# Patient Record
Sex: FEMALE | Race: WHITE | NOT HISPANIC OR LATINO | URBAN - METROPOLITAN AREA
[De-identification: names, ages, dates, MRNs, and addresses within clinical notes are randomized per-mention and may not be internally consistent; named-entity substitution may affect disease eponyms.]

---

## 2017-01-06 ENCOUNTER — OUTPATIENT (OUTPATIENT)
Dept: OUTPATIENT SERVICES | Facility: HOSPITAL | Age: 67
LOS: 1 days | Discharge: HOME | End: 2017-01-06

## 2017-02-14 ENCOUNTER — OUTPATIENT (OUTPATIENT)
Dept: OUTPATIENT SERVICES | Facility: HOSPITAL | Age: 67
LOS: 1 days | End: 2017-02-14

## 2017-06-21 ENCOUNTER — OUTPATIENT (OUTPATIENT)
Dept: OUTPATIENT SERVICES | Facility: HOSPITAL | Age: 67
LOS: 1 days | End: 2017-06-21

## 2017-10-12 ENCOUNTER — OUTPATIENT (OUTPATIENT)
Dept: OUTPATIENT SERVICES | Facility: HOSPITAL | Age: 67
LOS: 1 days | End: 2017-10-12

## 2018-01-02 ENCOUNTER — APPOINTMENT (OUTPATIENT)
Dept: COLORECTAL SURGERY | Facility: CLINIC | Age: 68
End: 2018-01-02
Payer: MEDICARE

## 2018-01-02 VITALS
WEIGHT: 188 LBS | TEMPERATURE: 98.1 F | DIASTOLIC BLOOD PRESSURE: 82 MMHG | HEIGHT: 66 IN | SYSTOLIC BLOOD PRESSURE: 155 MMHG | BODY MASS INDEX: 30.22 KG/M2 | HEART RATE: 74 BPM

## 2018-01-02 DIAGNOSIS — K59.09 OTHER CONSTIPATION: ICD-10-CM

## 2018-01-02 DIAGNOSIS — Z86.79 PERSONAL HISTORY OF OTHER DISEASES OF THE CIRCULATORY SYSTEM: ICD-10-CM

## 2018-01-02 DIAGNOSIS — Z86.010 PERSONAL HISTORY OF COLONIC POLYPS: ICD-10-CM

## 2018-01-02 PROCEDURE — 99204 OFFICE O/P NEW MOD 45 MIN: CPT

## 2018-01-02 PROCEDURE — 46221 LIGATION OF HEMORRHOID(S): CPT

## 2018-02-06 ENCOUNTER — APPOINTMENT (OUTPATIENT)
Dept: COLORECTAL SURGERY | Facility: CLINIC | Age: 68
End: 2018-02-06
Payer: MEDICARE

## 2018-02-06 VITALS
WEIGHT: 188 LBS | BODY MASS INDEX: 30.22 KG/M2 | HEART RATE: 72 BPM | SYSTOLIC BLOOD PRESSURE: 144 MMHG | HEIGHT: 66 IN | TEMPERATURE: 98.1 F | DIASTOLIC BLOOD PRESSURE: 80 MMHG

## 2018-02-06 DIAGNOSIS — K64.4 RESIDUAL HEMORRHOIDAL SKIN TAGS: ICD-10-CM

## 2018-02-06 DIAGNOSIS — K64.8 RESIDUAL HEMORRHOIDAL SKIN TAGS: ICD-10-CM

## 2018-02-06 PROCEDURE — 99214 OFFICE O/P EST MOD 30 MIN: CPT | Mod: 25

## 2018-02-06 PROCEDURE — 46221 LIGATION OF HEMORRHOID(S): CPT

## 2018-03-14 ENCOUNTER — OUTPATIENT (OUTPATIENT)
Dept: OUTPATIENT SERVICES | Facility: HOSPITAL | Age: 68
LOS: 1 days | End: 2018-03-14

## 2018-07-03 ENCOUNTER — OUTPATIENT (OUTPATIENT)
Dept: OUTPATIENT SERVICES | Facility: HOSPITAL | Age: 68
LOS: 1 days | End: 2018-07-03

## 2018-11-08 ENCOUNTER — OUTPATIENT (OUTPATIENT)
Dept: OUTPATIENT SERVICES | Facility: HOSPITAL | Age: 68
LOS: 1 days | End: 2018-11-08

## 2019-03-13 ENCOUNTER — OUTPATIENT (OUTPATIENT)
Dept: OUTPATIENT SERVICES | Facility: HOSPITAL | Age: 69
LOS: 1 days | End: 2019-03-13

## 2019-04-29 ENCOUNTER — OUTPATIENT (OUTPATIENT)
Dept: OUTPATIENT SERVICES | Facility: HOSPITAL | Age: 69
LOS: 1 days | End: 2019-04-29

## 2019-05-22 ENCOUNTER — EMERGENCY (EMERGENCY)
Facility: HOSPITAL | Age: 69
LOS: 1 days | End: 2019-05-22
Admitting: EMERGENCY MEDICINE
Payer: MEDICARE

## 2019-05-22 PROCEDURE — 99283 EMERGENCY DEPT VISIT LOW MDM: CPT

## 2019-05-22 PROCEDURE — 71046 X-RAY EXAM CHEST 2 VIEWS: CPT | Mod: 26

## 2019-07-19 ENCOUNTER — OUTPATIENT (OUTPATIENT)
Dept: OUTPATIENT SERVICES | Facility: HOSPITAL | Age: 69
LOS: 1 days | End: 2019-07-19

## 2019-07-26 ENCOUNTER — OUTPATIENT (OUTPATIENT)
Dept: OUTPATIENT SERVICES | Facility: HOSPITAL | Age: 69
LOS: 1 days | End: 2019-07-26

## 2019-08-07 ENCOUNTER — APPOINTMENT (OUTPATIENT)
Dept: CT IMAGING | Facility: CLINIC | Age: 69
End: 2019-08-07
Payer: MEDICARE

## 2019-08-09 PROCEDURE — 70450 CT HEAD/BRAIN W/O DYE: CPT

## 2019-11-25 ENCOUNTER — OUTPATIENT (OUTPATIENT)
Dept: OUTPATIENT SERVICES | Facility: HOSPITAL | Age: 69
LOS: 1 days | End: 2019-11-25

## 2020-01-17 ENCOUNTER — OUTPATIENT (OUTPATIENT)
Dept: OUTPATIENT SERVICES | Facility: HOSPITAL | Age: 70
LOS: 1 days | End: 2020-01-17

## 2020-01-30 ENCOUNTER — APPOINTMENT (OUTPATIENT)
Dept: MRI IMAGING | Facility: CLINIC | Age: 70
End: 2020-01-30
Payer: MEDICARE

## 2020-01-30 PROCEDURE — 70551 MRI BRAIN STEM W/O DYE: CPT

## 2020-03-24 ENCOUNTER — OUTPATIENT (OUTPATIENT)
Dept: OUTPATIENT SERVICES | Facility: HOSPITAL | Age: 70
LOS: 1 days | End: 2020-03-24

## 2020-06-01 ENCOUNTER — OUTPATIENT (OUTPATIENT)
Dept: OUTPATIENT SERVICES | Facility: HOSPITAL | Age: 70
LOS: 1 days | End: 2020-06-01

## 2020-06-11 ENCOUNTER — APPOINTMENT (OUTPATIENT)
Dept: MAMMOGRAPHY | Facility: CLINIC | Age: 70
End: 2020-06-11
Payer: MEDICARE

## 2020-06-11 PROCEDURE — 77063 BREAST TOMOSYNTHESIS BI: CPT

## 2020-06-11 PROCEDURE — 77067 SCR MAMMO BI INCL CAD: CPT

## 2020-08-08 ENCOUNTER — APPOINTMENT (OUTPATIENT)
Dept: MRI IMAGING | Facility: CLINIC | Age: 70
End: 2020-08-08

## 2020-11-16 ENCOUNTER — APPOINTMENT (OUTPATIENT)
Dept: RADIOLOGY | Facility: CLINIC | Age: 70
End: 2020-11-16
Payer: MEDICARE

## 2020-11-16 PROCEDURE — 77080 DXA BONE DENSITY AXIAL: CPT

## 2021-08-14 ENCOUNTER — APPOINTMENT (OUTPATIENT)
Dept: MAMMOGRAPHY | Facility: CLINIC | Age: 71
End: 2021-08-14
Payer: MEDICARE

## 2021-08-14 PROCEDURE — 77063 BREAST TOMOSYNTHESIS BI: CPT

## 2021-08-14 PROCEDURE — 77067 SCR MAMMO BI INCL CAD: CPT

## 2021-08-31 ENCOUNTER — APPOINTMENT (OUTPATIENT)
Dept: MAMMOGRAPHY | Facility: CLINIC | Age: 71
End: 2021-08-31
Payer: MEDICARE

## 2021-08-31 ENCOUNTER — APPOINTMENT (OUTPATIENT)
Dept: ULTRASOUND IMAGING | Facility: CLINIC | Age: 71
End: 2021-08-31

## 2021-08-31 PROCEDURE — 76642 ULTRASOUND BREAST LIMITED: CPT | Mod: LT

## 2021-08-31 PROCEDURE — G0279: CPT | Mod: LT

## 2021-08-31 PROCEDURE — 77065 DX MAMMO INCL CAD UNI: CPT | Mod: LT

## 2022-01-11 ENCOUNTER — APPOINTMENT (OUTPATIENT)
Dept: ULTRASOUND IMAGING | Facility: CLINIC | Age: 72
End: 2022-01-11
Payer: MEDICARE

## 2022-01-11 PROCEDURE — 76641 ULTRASOUND BREAST COMPLETE: CPT | Mod: LT

## 2022-03-31 ENCOUNTER — APPOINTMENT (OUTPATIENT)
Age: 72
End: 2022-03-31
Payer: MEDICARE

## 2022-03-31 VITALS
HEIGHT: 66.14 IN | WEIGHT: 210 LBS | SYSTOLIC BLOOD PRESSURE: 125 MMHG | HEART RATE: 58 BPM | TEMPERATURE: 96.9 F | BODY MASS INDEX: 33.75 KG/M2 | DIASTOLIC BLOOD PRESSURE: 79 MMHG

## 2022-03-31 DIAGNOSIS — K80.20 CALCULUS OF GALLBLADDER W/OUT CHOLECYSTITIS W/OUT OBSTRUCTION: ICD-10-CM

## 2022-03-31 DIAGNOSIS — E07.9 DISORDER OF THYROID, UNSPECIFIED: ICD-10-CM

## 2022-03-31 DIAGNOSIS — E78.00 PURE HYPERCHOLESTEROLEMIA, UNSPECIFIED: ICD-10-CM

## 2022-03-31 DIAGNOSIS — K21.9 GASTRO-ESOPHAGEAL REFLUX DISEASE W/OUT ESOPHAGITIS: ICD-10-CM

## 2022-03-31 DIAGNOSIS — I10 ESSENTIAL (PRIMARY) HYPERTENSION: ICD-10-CM

## 2022-03-31 PROCEDURE — 99203 OFFICE O/P NEW LOW 30 MIN: CPT

## 2022-03-31 RX ORDER — CALCIUM 250 MG
TABLET ORAL
Refills: 0 | Status: ACTIVE | COMMUNITY

## 2022-03-31 RX ORDER — SIMVASTATIN 40 MG/1
40 TABLET, FILM COATED ORAL
Refills: 0 | Status: ACTIVE | COMMUNITY

## 2022-03-31 RX ORDER — LISINOPRIL 10 MG/1
10 TABLET ORAL
Refills: 0 | Status: ACTIVE | COMMUNITY

## 2022-03-31 RX ORDER — DIAZEPAM 5 MG/1
5 TABLET ORAL
Refills: 0 | Status: ACTIVE | COMMUNITY

## 2022-03-31 RX ORDER — ISOSORBIDE MONONITRATE 30 MG
30 TABLET, EXTENDED RELEASE 24 HR ORAL
Refills: 0 | Status: ACTIVE | COMMUNITY

## 2022-03-31 RX ORDER — LAMOTRIGINE 300 MG/1
300 TABLET, EXTENDED RELEASE ORAL
Refills: 0 | Status: ACTIVE | COMMUNITY

## 2022-03-31 RX ORDER — ASPIRIN 325 MG/1
325 TABLET ORAL
Refills: 0 | Status: ACTIVE | COMMUNITY

## 2022-03-31 RX ORDER — LEVOTHYROXINE SODIUM 0.17 MG/1
TABLET ORAL
Refills: 0 | Status: ACTIVE | COMMUNITY

## 2022-03-31 NOTE — ASSESSMENT
[FreeTextEntry1] : Pt is a 71 year old female referred for consultation by Dr. Amy Frederick for ? left cellulitis and mastalgia. Was given Keflex yesterday by him but pt has not yet started. Pt denies fever or chills.\par Here with her sister, Urszula. Pt speaks Polish, sister is translating and she declines . \par Pt states she has had pain in her breasts since her mmg and more bothersome since October. Right nipple pain and Left also but recently some erythema. No masses or d.c.\par \par 8/14/21, NFR, Bilat sMMG: Fg, no susp findings, nodule in UO L breast appears somewhat more prominent than prev, rec add imaging, BR0\par 8/31/21, NFR, L dMMG: FG, bilat circumscribed breast nodularity is not sig changed including a 6x4mm circumscribed nodule in RA 3:00 mid L breast noted on recent screening; L U/S: no susp solid mass, multiple subcm sized cysts noted in UOQ up to 8mm 2:00 2N, mmg 1 year, BR2\par 1/11/22, NFR, L U/S: no susp solid mass, stable 8x6mm cyst 2:00 2N, 8x6mm cyst 3:00 1N, stable subcm cysts in UOQ 3mm, 4mm and 7mm, 6x5mm cyst 9:00 2N, stable 6x4mm cyst 9:00 2N, 2.5x0.7cm benign-appearing axillary lymph node, benign-appearing 10x6mm axillary lymph node, rec mmg and u/s 6 mos, BR3\par \par Fhx: Mother with uterine cancer and brother with lung cancer.\par CBE: Right, some NAC tenderness but no induration, no erythema, and no lesions. Left NAC more tender with slight erythema medial with no evidence of abscess. No underlying mass. No axillary adenopathy.\par Reviewed findings with pt, multiple cysts on left, stable. Rec start the Keflex and symptomatic treatment with NSAIDs, tylenol. F.u next week, sooner as needed. \par Pt and sister reassured finding more consistent with left early cellulitis than malignancy but rec close f.u. and return next week.

## 2022-03-31 NOTE — PHYSICAL EXAM
[Bra Size: ___] : Bra Size: [unfilled] [Normocephalic] : normocephalic [Atraumatic] : atraumatic [Supple] : supple [No Supraclavicular Adenopathy] : no supraclavicular adenopathy [Examined in the supine and seated position] : examined in the supine and seated position [No dominant masses] : no dominant masses in right breast  [No dominant masses] : no dominant masses left breast [No Nipple Retraction] : no left nipple retraction [No Nipple Discharge] : no left nipple discharge [No Axillary Lymphadenopathy] : no left axillary lymphadenopathy [No Edema] : no edema [No Swelling] : no swelling [Full ROM] : full range of motion [No Rashes] : no rashes [No Ulceration] : no ulceration [de-identified] : Slight erythema medial to nipple, tender, no fluctuance or masses.

## 2022-03-31 NOTE — DATA REVIEWED
[FreeTextEntry1] : 8/14/21, NFR, Bilat sMMG: Fg, no susp findings, nodule in UO L breast appears somewhat more prominent than prev, rec add imaging, BR0\par 8/31/21, NFR, L dMMG: FG, bilat circumscribed breast nodularity is not sig changed including a 6x4mm circumscribed nodule in RA 3:00 mid L breast noted on recent screening; L U/S: no susp solid mass, multiple subcm sized cysts noted in UOQ up to 8mm 2:00 2N, mmg 1 year, BR2\par 1/11/22, NFR, L U/S: no susp solid mass, stable 8x6mm cyst 2:00 2N, 8x6mm cyst 3:00 1N, stable subcm cysts in UOQ 3mm, 4mm and 7mm, 6x5mm cyst 9:00 2N, stable 6x4mm cyst 9:00 2N, 2.5x0.7cm benign-appearing axillary lymph node, benign-appearing 10x6mm axillary lymph node, rec mmg and u/s 6 mos, BR3\par

## 2022-03-31 NOTE — HISTORY OF PRESENT ILLNESS
[FreeTextEntry1] : Pt is a 71 year old female referred for consultation by Dr. Amy Frederick for ? left cellulitis and mastalgia. Was given Keflex yesterday by him but pt has not yet started. Pt denies fever or chills.\par Here with her sister, Urszula. Pt speaks Polish, sister is translating and she declines . \par Pt states she has had pain in her breasts since her mmg and more bothersome since October. Right nipple pain and Left also but recently some erythema. No masses or d.c.\par \par 8/14/21, NFR, Bilat sMMG: Fg, no susp findings, nodule in UO L breast appears somewhat more prominent than prev, rec add imaging, BR0\par 8/31/21, NFR, L dMMG: FG, bilat circumscribed breast nodularity is not sig changed including a 6x4mm circumscribed nodule in RA 3:00 mid L breast noted on recent screening; L U/S: no susp solid mass, multiple subcm sized cysts noted in UOQ up to 8mm 2:00 2N, mmg 1 year, BR2\par 1/11/22, NFR, L U/S: no susp solid mass, stable 8x6mm cyst 2:00 2N, 8x6mm cyst 3:00 1N, stable subcm cysts in UOQ 3mm, 4mm and 7mm, 6x5mm cyst 9:00 2N, stable 6x4mm cyst 9:00 2N, 2.5x0.7cm benign-appearing axillary lymph node, benign-appearing 10x6mm axillary lymph node, rec mmg and u/s 6 mos, BR3\par \par Fhx: Mother with uterine cancer and brother with lung cancer.\par

## 2022-03-31 NOTE — PAST MEDICAL HISTORY
[Menarche Age ____] : age at menarche was [unfilled] [Menopause Age____] : age at menopause was [unfilled] [unknown] : the patient is unsure of the date of her LMP [Total Preg ___] : G[unfilled]

## 2022-03-31 NOTE — CONSULT LETTER
[Dear  ___] : Dear  [unfilled], [Consult Letter:] : I had the pleasure of evaluating your patient, [unfilled]. [Consult Closing:] : Thank you very much for allowing me to participate in the care of this patient.  If you have any questions, please do not hesitate to contact me. [Sincerely,] : Sincerely, [FreeTextEntry3] : Nati Campos MD

## 2022-04-07 ENCOUNTER — APPOINTMENT (OUTPATIENT)
Dept: BREAST CENTER | Facility: CLINIC | Age: 72
End: 2022-04-07
Payer: MEDICARE

## 2022-04-07 VITALS
WEIGHT: 214 LBS | SYSTOLIC BLOOD PRESSURE: 137 MMHG | HEIGHT: 66 IN | TEMPERATURE: 95.5 F | DIASTOLIC BLOOD PRESSURE: 83 MMHG | BODY MASS INDEX: 34.39 KG/M2 | HEART RATE: 66 BPM

## 2022-04-07 DIAGNOSIS — Z80.1 FAMILY HISTORY OF MALIGNANT NEOPLASM OF TRACHEA, BRONCHUS AND LUNG: ICD-10-CM

## 2022-04-07 DIAGNOSIS — Z80.49 FAMILY HISTORY OF MALIGNANT NEOPLASM OF OTHER GENITAL ORGANS: ICD-10-CM

## 2022-04-07 PROCEDURE — 99213 OFFICE O/P EST LOW 20 MIN: CPT

## 2022-04-07 NOTE — ASSESSMENT
[FreeTextEntry1] : Pt is a 71 year old female here for f/u for ? left cellulitis and mastalgia. Has been on Keflex x7 days, has 3  more days. No fever or chills.  Feels the erythema has improved as well as the tenderness. \par Here with her sister, Urszula. Pt speaks Polish, sister is translating and she declines . \par Pt states she has had pain in her breasts since her mmg and more bothersome since October. Right nipple pain and Left also but recently some erythema. No masses or d.c. Started 10 day course of Keflex 4/1/22 (on day 6)\par Referred by Dr. Amy Frederick\par \par 8/14/21, NFR, Bilat sMMG: Fg, no susp findings, nodule in UO L breast appears somewhat more prominent than prev, rec add imaging, BR0\par 8/31/21, NFR, L dMMG: FG, bilat circumscribed breast nodularity is not sig changed including a 6x4mm circumscribed nodule in RA 3:00 mid L breast noted on recent screening; L U/S: no susp solid mass, multiple subcm sized cysts noted in UOQ up to 8mm 2:00 2N, mmg 1 year, BR2\par 1/11/22, NFR, L U/S: no susp solid mass, stable 8x6mm cyst 2:00 2N, 8x6mm cyst 3:00 1N, stable subcm cysts in UOQ 3mm, 4mm and 7mm, 6x5mm cyst 9:00 2N, stable 6x4mm cyst 9:00 2N, 2.5x0.7cm benign-appearing axillary lymph node, benign-appearing 10x6mm axillary lymph node, rec mmg and u/s 6 mos, BR3\par \par Fhx: Mother with uterine cancer and brother with lung cancer.\par CBE: Right, some NAC tenderness but no induration, no erythema, and no lesions. Left NAC less tender with improvement of slight erythema medially with no evidence of abscess. No underlying mass. No axillary adenopathy.\par Pt to f/u w/ Dr. Frederick On Monday 4/11 - Continue abx and rec for 4 more days of Keflex for total of 14 days. Pt to f/u next Wednesday or sooner as needed.

## 2022-04-07 NOTE — CONSULT LETTER
[Dear  ___] : Dear  [unfilled], [Courtesy Letter:] : I had the pleasure of seeing your patient, [unfilled], in my office today. [Please see my note below.] : Please see my note below. [Consult Closing:] : Thank you very much for allowing me to participate in the care of this patient.  If you have any questions, please do not hesitate to contact me. [Sincerely,] : Sincerely, [FreeTextEntry3] : Nati Campos MD

## 2022-04-07 NOTE — HISTORY OF PRESENT ILLNESS
[FreeTextEntry1] : Pt is a 71 year old female here for f/u for ? left cellulitis and mastalgia. Has been on Keflex x7 days, has 3  more days. No fever or chills.  Feels the erythema has improved as well as the tenderness. \par Here with her sister, Urszula. Pt speaks Polish, sister is translating and she declines . \par Pt states she has had pain in her breasts since her mmg and more bothersome since October. Right nipple pain and Left also but recently some erythema. No masses or d.c. Started 10 day course of Keflex 4/1/22 (on day 6)\par Referred by Dr. Amy Frederick\par \par 8/14/21, NFR, Bilat sMMG: Fg, no susp findings, nodule in UO L breast appears somewhat more prominent than prev, rec add imaging, BR0\par 8/31/21, NFR, L dMMG: FG, bilat circumscribed breast nodularity is not sig changed including a 6x4mm circumscribed nodule in RA 3:00 mid L breast noted on recent screening; L U/S: no susp solid mass, multiple subcm sized cysts noted in UOQ up to 8mm 2:00 2N, mmg 1 year, BR2\par 1/11/22, NFR, L U/S: no susp solid mass, stable 8x6mm cyst 2:00 2N, 8x6mm cyst 3:00 1N, stable subcm cysts in UOQ 3mm, 4mm and 7mm, 6x5mm cyst 9:00 2N, stable 6x4mm cyst 9:00 2N, 2.5x0.7cm benign-appearing axillary lymph node, benign-appearing 10x6mm axillary lymph node, rec mmg and u/s 6 mos, BR3\par \par Fhx: Mother with uterine cancer and brother with lung cancer.\par

## 2022-04-07 NOTE — PHYSICAL EXAM
[Normocephalic] : normocephalic [Atraumatic] : atraumatic [Supple] : supple [No Supraclavicular Adenopathy] : no supraclavicular adenopathy [No Thyromegaly] : no thyromegaly [Examined in the supine and seated position] : examined in the supine and seated position [No dominant masses] : no dominant masses in right breast  [No dominant masses] : no dominant masses left breast [No Nipple Retraction] : no left nipple retraction [No Nipple Discharge] : no left nipple discharge [No Axillary Lymphadenopathy] : no left axillary lymphadenopathy [No Edema] : no edema [No Swelling] : no swelling [Full ROM] : full range of motion [No Rashes] : no rashes [No Ulceration] : no ulceration [de-identified] : Right, some NAC tenderness but no induration, no erythema, and no lesions. Left NAC less tender with improvement of slight erythema medially with no evidence of abscess. No underlying mass.

## 2022-04-13 ENCOUNTER — APPOINTMENT (OUTPATIENT)
Age: 72
End: 2022-04-13
Payer: MEDICARE

## 2022-04-13 VITALS
WEIGHT: 214 LBS | TEMPERATURE: 97.3 F | DIASTOLIC BLOOD PRESSURE: 83 MMHG | HEIGHT: 66 IN | HEART RATE: 53 BPM | BODY MASS INDEX: 34.39 KG/M2 | SYSTOLIC BLOOD PRESSURE: 149 MMHG

## 2022-04-13 PROCEDURE — 99214 OFFICE O/P EST MOD 30 MIN: CPT

## 2022-04-13 NOTE — HISTORY OF PRESENT ILLNESS
[FreeTextEntry1] : Pt is a 71 year old female here for f/u for ? left cellulitis and mastalgia. Has been on Keflex x13 days, has 1 more day. No fever or chills. Feels the erythema has improved as well as the tenderness but still has some residual. \par Here with her sister, Urszula. Pt speaks Polish, sister is translating and she declines . \par Pt states she has had pain in her breasts since her mmg and more bothersome since October. Right nipple pain and Left also but recently some erythema. No masses or d.c. Started 10 day course of Keflex 4/1/22.\par Referred by Dr. Amy Frederick\par Saw Dr. Frederick and he rec continued f.u with me.\par 8/14/21, NFR, Bilat sMMG: Fg, no susp findings, nodule in UO L breast appears somewhat more prominent than prev, rec add imaging, BR0\par 8/31/21, NFR, L dMMG: FG, bilat circumscribed breast nodularity is not sig changed including a 6x4mm circumscribed nodule in RA 3:00 mid L breast noted on recent screening; L U/S: no susp solid mass, multiple subcm sized cysts noted in UOQ up to 8mm 2:00 2N, mmg 1 year, BR2\par 1/11/22, NFR, L U/S: no susp solid mass, stable 8x6mm cyst 2:00 2N, 8x6mm cyst 3:00 1N, stable subcm cysts in UOQ 3mm, 4mm and 7mm, 6x5mm cyst 9:00 2N, stable 6x4mm cyst 9:00 2N, 2.5x0.7cm benign-appearing axillary lymph node, benign-appearing 10x6mm axillary lymph node, rec mmg and u/s 6 mos, BR3\par \par Fhx: Mother with uterine cancer and brother with lung cancer.\par

## 2022-04-13 NOTE — CONSULT LETTER
[Dear  ___] : Dear  [unfilled], [Consult Letter:] : I had the pleasure of evaluating your patient, [unfilled]. [Please see my note below.] : Please see my note below. [Consult Closing:] : Thank you very much for allowing me to participate in the care of this patient.  If you have any questions, please do not hesitate to contact me. [Sincerely,] : Sincerely, [FreeTextEntry3] : Nati Campos MD

## 2022-04-13 NOTE — ASSESSMENT
[FreeTextEntry1] : Pt is a 71 year old female here for f/u for ? left cellulitis and mastalgia. Has been on Keflex x13 days, has 1 more day. No fever or chills. Feels the erythema has improved as well as the tenderness but still has some residual. \par Here with her sister, Urszula. Pt speaks Polish, sister is translating and she declines . \par Pt states she has had pain in her breasts since her mmg and more bothersome since October. Right nipple pain and Left also but recently some erythema. No masses or d.c. Started 10 day course of Keflex 4/1/22.\par Referred by Dr. Amy Frederick\par Saw Dr. Frederick and he rec continued f.u with me.\par 8/14/21, NFR, Bilat sMMG: Fg, no susp findings, nodule in UO L breast appears somewhat more prominent than prev, rec add imaging, BR0\par 8/31/21, NFR, L dMMG: FG, bilat circumscribed breast nodularity is not sig changed including a 6x4mm circumscribed nodule in RA 3:00 mid L breast noted on recent screening; L U/S: no susp solid mass, multiple subcm sized cysts noted in UOQ up to 8mm 2:00 2N, mmg 1 year, BR2\par 1/11/22, NFR, L U/S: no susp solid mass, stable 8x6mm cyst 2:00 2N, 8x6mm cyst 3:00 1N, stable subcm cysts in UOQ 3mm, 4mm and 7mm, 6x5mm cyst 9:00 2N, stable 6x4mm cyst 9:00 2N, 2.5x0.7cm benign-appearing axillary lymph node, benign-appearing 10x6mm axillary lymph node, rec mmg and u/s 6 mos, BR3\par \par Fhx: Mother with uterine cancer and brother with lung cancer.\par CBE: Right, some NAC tenderness, no erythema, and no lesions. Left NAC with continued slight induration. No underlying mass. On further exam the tenderness on left appears to be more chest wall MSK tenderness than breast tenderness.  No axillary adenopathy.\par Pt is not requiring pain meds as the left tenderness is only with palpation. Pt's sister states the issues started after her mmg.  States patient didn't have any pain prior to it. Discussed could be costochondritis, or trauma from the mammogram but rec symptomatic treatment.  Discussed prob MSK, tylenol for any discomfort rec. Pt is on 325 of aspirin so does not want to take NSAIDs.\par Given the diffuse slight NAC induration, rec breast MRI. \par Finish the antibiotics. \par \par

## 2022-04-21 ENCOUNTER — APPOINTMENT (OUTPATIENT)
Dept: MRI IMAGING | Facility: CLINIC | Age: 72
End: 2022-04-21
Payer: MEDICARE

## 2022-04-21 PROCEDURE — 77049 MRI BREAST C-+ W/CAD BI: CPT | Mod: ME

## 2022-04-21 PROCEDURE — A9585: CPT | Mod: JW

## 2022-04-22 ENCOUNTER — NON-APPOINTMENT (OUTPATIENT)
Age: 72
End: 2022-04-22

## 2022-04-27 ENCOUNTER — APPOINTMENT (OUTPATIENT)
Dept: BREAST CENTER | Facility: CLINIC | Age: 72
End: 2022-04-27
Payer: MEDICARE

## 2022-04-27 VITALS
TEMPERATURE: 97.1 F | WEIGHT: 213.18 LBS | SYSTOLIC BLOOD PRESSURE: 155 MMHG | HEIGHT: 66 IN | DIASTOLIC BLOOD PRESSURE: 84 MMHG | BODY MASS INDEX: 34.26 KG/M2 | HEART RATE: 62 BPM

## 2022-04-27 DIAGNOSIS — N64.51 INDURATION OF BREAST: ICD-10-CM

## 2022-04-27 DIAGNOSIS — N64.4 MASTODYNIA: ICD-10-CM

## 2022-04-27 DIAGNOSIS — N61.0 MASTITIS WITHOUT ABSCESS: ICD-10-CM

## 2022-04-27 DIAGNOSIS — R93.89 ABNORMAL FINDINGS ON DIAGNOSTIC IMAGING OF OTHER SPECIFIED BODY STRUCTURES: ICD-10-CM

## 2022-04-27 PROCEDURE — 99213 OFFICE O/P EST LOW 20 MIN: CPT

## 2022-04-27 NOTE — HISTORY OF PRESENT ILLNESS
[FreeTextEntry1] : Pt is a 71 year old female here for f/u after recent MRI for ? left cellulitis and mastalgia. Had been on Keflex x13 days. No fever or chills. Erythema has improved as well as the tenderness but still has some residual. \par Here with her sister, Urszula. Pt speaks Polish, sister is translating and she declines . \par Pt with pain in her breasts since her mmg and more bothersome since October. Right nipple pain and Left also but recently some erythema. No masses or d.c. \par Referred by Dr. Amy Frederick\par Saw Dr. Frederick and he rec continued f.u with me.\par 8/14/21, NFR, Bilat sMMG: Fg, no susp findings, nodule in UO L breast appears somewhat more prominent than prev, rec add imaging, BR0\par 8/31/21, NFR, L dMMG: FG, bilat circumscribed breast nodularity is not sig changed including a 6x4mm circumscribed nodule in RA 3:00 mid L breast noted on recent screening; L U/S: no susp solid mass, multiple subcm sized cysts noted in UOQ up to 8mm 2:00 2N, mmg 1 year, BR2\par 1/11/22, NFR, L U/S: no susp solid mass, stable 8x6mm cyst 2:00 2N, 8x6mm cyst 3:00 1N, stable subcm cysts in UOQ 3mm, 4mm and 7mm, 6x5mm cyst 9:00 2N, stable 6x4mm cyst 9:00 2N, 2.5x0.7cm benign-appearing axillary lymph node, benign-appearing 10x6mm axillary lymph node, rec mmg and u/s 6 mos, BR3\par \par 4/21/22, Sharla, MRI: R negative, L overall generalized increased enhancement, somewhat asymm ehancement in central L breast approx 12:00, bx rec, BR4B\par \par Fhx: Mother with uterine cancer and brother with lung cancer.\par \par

## 2022-04-27 NOTE — DATA REVIEWED
[FreeTextEntry1] : 4/21/22, Sharla, MRI: R negative, L overall generalized increased enhancement, somewhat asymm ehancement in central L breast approx 12:00, bx rec, BR4B

## 2022-04-27 NOTE — PHYSICAL EXAM
[Normocephalic] : normocephalic [Atraumatic] : atraumatic [Supple] : supple [No Supraclavicular Adenopathy] : no supraclavicular adenopathy [No Thyromegaly] : no thyromegaly [Examined in the supine and seated position] : examined in the supine and seated position [Bra Size: ___] : Bra Size: [unfilled] [No dominant masses] : no dominant masses in right breast  [No dominant masses] : no dominant masses left breast [No Nipple Retraction] : no left nipple retraction [No Nipple Discharge] : no left nipple discharge [No Axillary Lymphadenopathy] : no left axillary lymphadenopathy [No Edema] : no edema [No Swelling] : no swelling [Full ROM] : full range of motion [No Rashes] : no rashes [No Ulceration] : no ulceration [de-identified] : Right, some NAC tenderness, no erythema, and no lesions. Left NAC with minimal slight induration. No underlying mass.

## 2022-04-27 NOTE — ASSESSMENT
[FreeTextEntry1] : Pt is a 71 year old female here for f/u after recent MRI for ? left cellulitis and mastalgia. Had been on Keflex x13 days. No fever or chills. No further erythema but still has some discomfort bilat, L>R. \par Here with her sister, Urszula. Pt speaks Polish, sister is translating and she declines . \par Pt states she has had pain in her breasts since her mmg and more bothersome since October. Right nipple pain and Left also but recently some erythema. No masses or d.c. Started 10 day course of Keflex 4/1/22.\par Referred by Dr. Amy Frederick\par Saw Dr. Frederick and he rec continued f.u with me.\par 8/14/21, NFR, Bilat sMMG: Fg, no susp findings, nodule in UO L breast appears somewhat more prominent than prev, rec add imaging, BR0\par 8/31/21, NFR, L dMMG: FG, bilat circumscribed breast nodularity is not sig changed including a 6x4mm circumscribed nodule in RA 3:00 mid L breast noted on recent screening; L U/S: no susp solid mass, multiple subcm sized cysts noted in UOQ up to 8mm 2:00 2N, mmg 1 year, BR2\par 1/11/22, NFR, L U/S: no susp solid mass, stable 8x6mm cyst 2:00 2N, 8x6mm cyst 3:00 1N, stable subcm cysts in UOQ 3mm, 4mm and 7mm, 6x5mm cyst 9:00 2N, stable 6x4mm cyst 9:00 2N, 2.5x0.7cm benign-appearing axillary lymph node, benign-appearing 10x6mm axillary lymph node, rec mmg and u/s 6 mos, BR3\par \par 4/21/22, Sharla, MRI: R negative, L overall generalized increased enhancement, somewhat asymm ehancement in central L breast approx 12:00, bx rec, BR4B\par \par Fhx: Mother with uterine cancer and brother with lung cancer.\par CBE: Right, some NAC tenderness, no erythema, and no lesions. Left NAC with continued slight induration. No underlying mass. No axillary adenopathy.\par Reviewed w/ pt and sister recent MRI - rec for L MRI bx of asymmetry. Order placed and Schedulers will reach out. F/u after biopsy or sooner as needed.

## 2022-05-09 ENCOUNTER — RESULT REVIEW (OUTPATIENT)
Age: 72
End: 2022-05-09

## 2022-05-09 ENCOUNTER — APPOINTMENT (OUTPATIENT)
Dept: MRI IMAGING | Facility: CLINIC | Age: 72
End: 2022-05-09
Payer: MEDICARE

## 2022-05-09 PROCEDURE — 77065 DX MAMMO INCL CAD UNI: CPT | Mod: LT

## 2022-05-09 PROCEDURE — 88321 CONSLTJ&REPRT SLD PREP ELSWR: CPT

## 2022-05-09 PROCEDURE — 19085Z: CUSTOM

## 2022-05-17 ENCOUNTER — NON-APPOINTMENT (OUTPATIENT)
Age: 72
End: 2022-05-17

## 2022-05-20 ENCOUNTER — NON-APPOINTMENT (OUTPATIENT)
Age: 72
End: 2022-05-20

## 2022-05-23 ENCOUNTER — APPOINTMENT (OUTPATIENT)
Dept: BREAST CENTER | Facility: CLINIC | Age: 72
End: 2022-05-23
Payer: MEDICARE

## 2022-05-23 VITALS
HEIGHT: 66 IN | WEIGHT: 210 LBS | TEMPERATURE: 97 F | HEART RATE: 73 BPM | DIASTOLIC BLOOD PRESSURE: 79 MMHG | SYSTOLIC BLOOD PRESSURE: 133 MMHG | BODY MASS INDEX: 33.75 KG/M2

## 2022-05-23 DIAGNOSIS — Z01.818 ENCOUNTER FOR OTHER PREPROCEDURAL EXAMINATION: ICD-10-CM

## 2022-05-23 PROCEDURE — 99215 OFFICE O/P EST HI 40 MIN: CPT

## 2022-05-26 PROBLEM — Z01.818 PREOP TESTING: Status: ACTIVE | Noted: 2022-05-26

## 2022-05-27 ENCOUNTER — OUTPATIENT (OUTPATIENT)
Dept: OUTPATIENT SERVICES | Facility: HOSPITAL | Age: 72
LOS: 1 days | End: 2022-05-27

## 2022-05-27 DIAGNOSIS — Z71.2 PERSON CONSULTING FOR EXPLANATION OF EXAMINATION OR TEST FINDINGS: ICD-10-CM

## 2022-06-02 ENCOUNTER — OUTPATIENT (OUTPATIENT)
Dept: OUTPATIENT SERVICES | Facility: HOSPITAL | Age: 72
LOS: 1 days | End: 2022-06-02
Payer: MEDICARE

## 2022-06-02 PROCEDURE — 71046 X-RAY EXAM CHEST 2 VIEWS: CPT | Mod: 26

## 2022-06-04 DIAGNOSIS — Z01.812 ENCOUNTER FOR PREPROCEDURAL LABORATORY EXAMINATION: ICD-10-CM

## 2022-06-04 DIAGNOSIS — D05.02 LOBULAR CARCINOMA IN SITU OF LEFT BREAST: ICD-10-CM

## 2022-06-07 ENCOUNTER — NON-APPOINTMENT (OUTPATIENT)
Age: 72
End: 2022-06-07

## 2022-06-09 ENCOUNTER — RESULT REVIEW (OUTPATIENT)
Age: 72
End: 2022-06-09

## 2022-06-09 ENCOUNTER — APPOINTMENT (OUTPATIENT)
Dept: MAMMOGRAPHY | Facility: CLINIC | Age: 72
End: 2022-06-09
Payer: MEDICARE

## 2022-06-09 PROCEDURE — 77065 DX MAMMO INCL CAD UNI: CPT | Mod: 59,LT

## 2022-06-09 PROCEDURE — 19281 PERQ DEVICE BREAST 1ST IMAG: CPT | Mod: LT

## 2022-06-14 LAB — SARS-COV-2 N GENE NPH QL NAA+PROBE: NOT DETECTED

## 2022-06-16 ENCOUNTER — APPOINTMENT (OUTPATIENT)
Dept: BREAST CENTER | Facility: HOSPITAL | Age: 72
End: 2022-06-16

## 2022-06-16 ENCOUNTER — RESULT REVIEW (OUTPATIENT)
Age: 72
End: 2022-06-16

## 2022-06-16 ENCOUNTER — OUTPATIENT (OUTPATIENT)
Dept: OUTPATIENT SERVICES | Facility: HOSPITAL | Age: 72
LOS: 1 days | End: 2022-06-16
Payer: MEDICARE

## 2022-06-16 PROCEDURE — 88307 TISSUE EXAM BY PATHOLOGIST: CPT | Mod: 26

## 2022-06-16 PROCEDURE — 76098 X-RAY EXAM SURGICAL SPECIMEN: CPT | Mod: 26

## 2022-06-16 PROCEDURE — 19125 EXCISION BREAST LESION: CPT

## 2022-06-24 ENCOUNTER — APPOINTMENT (OUTPATIENT)
Dept: BREAST CENTER | Facility: CLINIC | Age: 72
End: 2022-06-24
Payer: MEDICARE

## 2022-06-24 VITALS
SYSTOLIC BLOOD PRESSURE: 144 MMHG | TEMPERATURE: 97.6 F | HEIGHT: 66 IN | WEIGHT: 211 LBS | DIASTOLIC BLOOD PRESSURE: 80 MMHG | BODY MASS INDEX: 33.91 KG/M2 | HEART RATE: 73 BPM

## 2022-06-24 PROCEDURE — 99024 POSTOP FOLLOW-UP VISIT: CPT

## 2022-06-24 NOTE — HISTORY OF PRESENT ILLNESS
[FreeTextEntry1] : Pt is a 71 year old female here for postop, 6/16/22 Left breast excisional biopsy surgical path: prior bx scar, both markers, clip and magseed seen, no residual LCIS. \par \par Here with her sister, Urszula. Pt speaks Polish, sister is translating and she declines .\par Doing well postop, no pain Rx but having some discomfort on left nipple. \par  On amoxicillin because IV site on right arm was erythematous, saw Dr. Frederick.  Has improved.\par \par Referred by Dr. Amy Frederick\par \par 8/14/21, NFR, Bilat sMMG: Fg, no susp findings, nodule in UO L breast appears somewhat more prominent than prev, rec add imaging, BR0\par 8/31/21, NFR, L dMMG: FG, bilat circumscribed breast nodularity is not sig changed including a 6x4mm circumscribed nodule in RA 3:00 mid L breast noted on recent screening; L U/S: no susp solid mass, multiple subcm sized cysts noted in UOQ up to 8mm 2:00 2N, mmg 1 year, BR2\par 1/11/22, NFR, L U/S: no susp solid mass, stable 8x6mm cyst 2:00 2N, 8x6mm cyst 3:00 1N, stable subcm cysts in UOQ 3mm, 4mm and 7mm, 6x5mm cyst 9:00 2N, stable 6x4mm cyst 9:00 2N, 2.5x0.7cm benign-appearing axillary lymph node, benign-appearing 10x6mm axillary lymph node, rec mmg and u/s 6 mos, BR3\par \par 4/21/22, Sharla, MRI: R negative, L overall generalized increased enhancement, somewhat asymm ehancement in central L breast approx 12:00, bx rec, BR4B\par \par 5/9/22, L MRI bx NME 12:00 PATH: focal LCIS, FCC w/ stromal fibrosis, focal ductal hyperplasia, adenosis, sclerosing adenosis, apocrine metaplasia and microcysts, concordant, rec surg/onc mgmt\par \par Fhx: Mother with uterine cancer and brother with lung cancer.\par \par \par

## 2022-06-24 NOTE — PHYSICAL EXAM
[de-identified] : UOQ inc intact and no evidence of cellulitis or hematoma. Healing well.  [de-identified] : Slight erythema on right forearm from cellulitis from IV (Improved significantly per pt's sister).

## 2022-06-24 NOTE — DATA REVIEWED
[FreeTextEntry1] : 6/16/22 Left breast excisional biopsy surgical path: prior bx scar, both markers, clip and magseed seen, no residual LCIS.

## 2022-06-24 NOTE — ASSESSMENT
[FreeTextEntry1] : Pt is a 71 year old female here for postop, 6/16/22 Left breast excisional biopsy surgical path: prior bx scar, both markers, clip and magseed seen, no residual LCIS. \par \par Here with her sister, Urszula. Pt speaks Polish, sister is translating and she declines .\par Doing well postop, no pain Rx but having some discomfort on left nipple. Discussed with pt that she has this issue prior to surgery, however it could be referred pain postop and to observe for now and tylenol or nsaids as needed.\par  On amoxicillin because IV site on right arm was erythematous, saw Dr. Frederick.  Has improved.\par \par Referred by Dr. Amy Frederick\par \par 8/14/21, NFR, Bilat sMMG: Fg, no susp findings, nodule in UO L breast appears somewhat more prominent than prev, rec add imaging, BR0\par 8/31/21, NFR, L dMMG: FG, bilat circumscribed breast nodularity is not sig changed including a 6x4mm circumscribed nodule in RA 3:00 mid L breast noted on recent screening; L U/S: no susp solid mass, multiple subcm sized cysts noted in UOQ up to 8mm 2:00 2N, mmg 1 year, BR2\par 1/11/22, NFR, L U/S: no susp solid mass, stable 8x6mm cyst 2:00 2N, 8x6mm cyst 3:00 1N, stable subcm cysts in UOQ 3mm, 4mm and 7mm, 6x5mm cyst 9:00 2N, stable 6x4mm cyst 9:00 2N, 2.5x0.7cm benign-appearing axillary lymph node, benign-appearing 10x6mm axillary lymph node, rec mmg and u/s 6 mos, BR3\par \par 4/21/22, Sharla, MRI: R negative, L overall generalized increased enhancement, somewhat asymm ehancement in central L breast approx 12:00, bx rec, BR4B\par \par 5/9/22, L MRI bx NME 12:00 PATH: focal LCIS, FCC w/ stromal fibrosis, focal ductal hyperplasia, adenosis, sclerosing adenosis, apocrine metaplasia and microcysts, concordant, rec surg/onc mgmt\par \par Fhx: Mother with uterine cancer and brother with lung cancer.\par CBE: Left UOQ intact, no evidence of cellulitis or hematoma. left nipple- appears nl. Right forearm slight erythema, improving per Pt's sister since on the amoxicillin.\par Reviewed path with pt and sister. No further LCIS. Rec appt with Dr. Rodríguez to discuss risk reduction.  Pt to f.u in 6 weeks. Sooner if issues. .\par \par

## 2022-06-25 DIAGNOSIS — E78.5 HYPERLIPIDEMIA, UNSPECIFIED: ICD-10-CM

## 2022-06-25 DIAGNOSIS — N64.89 OTHER SPECIFIED DISORDERS OF BREAST: ICD-10-CM

## 2022-06-25 DIAGNOSIS — D05.02 LOBULAR CARCINOMA IN SITU OF LEFT BREAST: ICD-10-CM

## 2022-06-25 DIAGNOSIS — K21.9 GASTRO-ESOPHAGEAL REFLUX DISEASE WITHOUT ESOPHAGITIS: ICD-10-CM

## 2022-06-25 DIAGNOSIS — I10 ESSENTIAL (PRIMARY) HYPERTENSION: ICD-10-CM

## 2022-06-25 DIAGNOSIS — N60.22 FIBROADENOSIS OF LEFT BREAST: ICD-10-CM

## 2022-08-08 ENCOUNTER — APPOINTMENT (OUTPATIENT)
Dept: BREAST CENTER | Facility: CLINIC | Age: 72
End: 2022-08-08

## 2022-08-08 VITALS
HEART RATE: 61 BPM | TEMPERATURE: 97.6 F | WEIGHT: 211 LBS | SYSTOLIC BLOOD PRESSURE: 112 MMHG | BODY MASS INDEX: 33.91 KG/M2 | HEIGHT: 66 IN | DIASTOLIC BLOOD PRESSURE: 74 MMHG

## 2022-08-08 PROCEDURE — 99024 POSTOP FOLLOW-UP VISIT: CPT

## 2022-08-08 RX ORDER — PANTOPRAZOLE 40 MG/1
40 TABLET, DELAYED RELEASE ORAL
Qty: 90 | Refills: 0 | Status: ACTIVE | COMMUNITY
Start: 2021-10-01

## 2022-08-08 RX ORDER — LITHIUM CARBONATE 450 MG/1
450 TABLET ORAL
Qty: 90 | Refills: 0 | Status: ACTIVE | COMMUNITY
Start: 2022-01-05

## 2022-08-08 RX ORDER — DOCUSATE SODIUM 100 MG/1
100 CAPSULE, LIQUID FILLED ORAL
Qty: 90 | Refills: 0 | Status: ACTIVE | COMMUNITY
Start: 2022-04-08

## 2022-08-08 RX ORDER — ASENAPINE 10 MG/1
10 TABLET SUBLINGUAL
Qty: 180 | Refills: 0 | Status: ACTIVE | COMMUNITY
Start: 2022-03-01

## 2022-08-08 NOTE — PHYSICAL EXAM
[Symmetrical] : symmetrical [de-identified] : Left UOQ intact, no evidence of cellulitis or hematoma. Post op changes.

## 2022-08-08 NOTE — HISTORY OF PRESENT ILLNESS
[FreeTextEntry1] : Pt is a 72 year old female here for postop, 6/16/22 Left breast excisional biopsy surgical path: prior bx scar, both markers, clip and magseed seen, no residual LCIS. \par \par Here with her sister, Urszula. Pt speaks Polish, sister is translating and she declines .\par Doing well postop, no pain. Thinks left side is bigger.\par Referred by Dr. Amy Frederick\par \par 8/14/21, NFR, Bilat sMMG: Fg, no susp findings, nodule in UO L breast appears somewhat more prominent than prev, rec add imaging, BR0\par 8/31/21, NFR, L dMMG: FG, bilat circumscribed breast nodularity is not sig changed including a 6x4mm circumscribed nodule in RA 3:00 mid L breast noted on recent screening; L U/S: no susp solid mass, multiple subcm sized cysts noted in UOQ up to 8mm 2:00 2N, mmg 1 year, BR2\par 1/11/22, NFR, L U/S: no susp solid mass, stable 8x6mm cyst 2:00 2N, 8x6mm cyst 3:00 1N, stable subcm cysts in UOQ 3mm, 4mm and 7mm, 6x5mm cyst 9:00 2N, stable 6x4mm cyst 9:00 2N, 2.5x0.7cm benign-appearing axillary lymph node, benign-appearing 10x6mm axillary lymph node, rec mmg and u/s 6 mos, BR3\par \par 4/21/22, Sharla, MRI: R negative, L overall generalized increased enhancement, somewhat asymm ehancement in central L breast approx 12:00, bx rec, BR4B\par \par 5/9/22, L MRI bx NME 12:00 PATH: focal LCIS, FCC w/ stromal fibrosis, focal ductal hyperplasia, adenosis, sclerosing adenosis, apocrine metaplasia and microcysts, concordant, rec surg/onc mgmt\par \par Fhx: Mother with uterine cancer and brother with lung cancer.\par CBE: Left UOQ scar healed well. Size of left breast normal and pt reassured.\par  IV cellulitis resolved.\par \par  Pt never saw med onc for risk reduction. \par

## 2022-08-08 NOTE — ASSESSMENT
[FreeTextEntry1] : Pt is a 72 year old female here for postop, 6/16/22 Left breast excisional biopsy surgical path: prior bx scar, both markers, clip and magseed seen, no residual LCIS. \par \par Here with her sister, Urszula. Pt speaks Polish, sister is translating and she declines .\par Doing well postop, no pain. Thinks left side is bigger.\par Referred by Dr. Amy Frederick\par \par 8/14/21, NFR, Bilat sMMG: Fg, no susp findings, nodule in UO L breast appears somewhat more prominent than prev, rec add imaging, BR0\par 8/31/21, NFR, L dMMG: FG, bilat circumscribed breast nodularity is not sig changed including a 6x4mm circumscribed nodule in RA 3:00 mid L breast noted on recent screening; L U/S: no susp solid mass, multiple subcm sized cysts noted in UOQ up to 8mm 2:00 2N, mmg 1 year, BR2\par 1/11/22, NFR, L U/S: no susp solid mass, stable 8x6mm cyst 2:00 2N, 8x6mm cyst 3:00 1N, stable subcm cysts in UOQ 3mm, 4mm and 7mm, 6x5mm cyst 9:00 2N, stable 6x4mm cyst 9:00 2N, 2.5x0.7cm benign-appearing axillary lymph node, benign-appearing 10x6mm axillary lymph node, rec mmg and u/s 6 mos, BR3\par \par 4/21/22, Sharla, MRI: R negative, L overall generalized increased enhancement, somewhat asymm ehancement in central L breast approx 12:00, bx rec, BR4B\par \par 5/9/22, L MRI bx NME 12:00 PATH: focal LCIS, FCC w/ stromal fibrosis, focal ductal hyperplasia, adenosis, sclerosing adenosis, apocrine metaplasia and microcysts, concordant, rec surg/onc mgmt\par \par Fhx: Mother with uterine cancer and brother with lung cancer.\par CBE: Left UOQ intact, no evidence of cellulitis or hematoma. left breast- appears nl size.  Right forearm slight erythema, improving per Pt's sister since on the amoxicillin.\par Rec appt with med onc to discuss risk reduction. Pt still has not seen medical oncology.\par \par  \par

## 2022-08-09 ENCOUNTER — NON-APPOINTMENT (OUTPATIENT)
Age: 72
End: 2022-08-09

## 2022-08-10 NOTE — ASSESSMENT
[FreeTextEntry1] : Pt is a 71 year old female here for f/u after recent MRI bx for abnormal bx, LCIS classic type. Had been on Keflex x13 days. No fever or chills. No further erythema but still has some discomfort bilat, L>R.  Pt now c.o pain since biopsy. \par \par Here with her sister, Urszula. Pt speaks Polish, sister is translating and she declines . \par Pt states she has had pain in her breasts since her mmg and more bothersome since October. Right nipple pain and Left also but recently some erythema. No masses or d.c. Started 10 day course of Keflex 4/1/22.\par Referred by Dr. Amy Frederick\par Saw Dr. Frederick and he rec continued f.u with me.\par 8/14/21, NFR, Bilat sMMG: Fg, no susp findings, nodule in UO L breast appears somewhat more prominent than prev, rec add imaging, BR0\par 8/31/21, NFR, L dMMG: FG, bilat circumscribed breast nodularity is not sig changed including a 6x4mm circumscribed nodule in RA 3:00 mid L breast noted on recent screening; L U/S: no susp solid mass, multiple subcm sized cysts noted in UOQ up to 8mm 2:00 2N, mmg 1 year, BR2\par 1/11/22, NFR, L U/S: no susp solid mass, stable 8x6mm cyst 2:00 2N, 8x6mm cyst 3:00 1N, stable subcm cysts in UOQ 3mm, 4mm and 7mm, 6x5mm cyst 9:00 2N, stable 6x4mm cyst 9:00 2N, 2.5x0.7cm benign-appearing axillary lymph node, benign-appearing 10x6mm axillary lymph node, rec mmg and u/s 6 mos, BR3\par \par 4/21/22, hSarla, MRI: R negative, L overall generalized increased enhancement, somewhat asymm ehancement in central L breast approx 12:00, bx rec, BR4B\par \par 5/9/22, L MRI bx NME 12:00 PATH: focal LCIS, FCC w/ stromal fibrosis, focal ductal hyperplasia, adenosis, sclerosing adenosis, apocrine metaplasia and microcysts, concordant, rec surg/onc mgmt\par \par Fhx: Mother with uterine cancer and brother with lung cancer.\par CBE: Right, some NAC tenderness, no erythema, and no lesions. Left NAC with ?slight induration and no skin changes. Tenderness now at 12:00 near site of biopsy. No evidence of post bx cellulitis or hematoma.  No underlying mass. No axillary adenopathy.\par \par Reviewed w/ pt and sister recent MRI  biopsy results. Pt prefers to have sister translate. Focal classic LCIS, reviewed in past used to excise but now given that it is classic, she has option to observe with close f.u. . Reviewed no malignancy and options discussed. Options are to f.u with MRI in 6 mos vs localized excisional biopsy. Discussed with classic LCIS, upgrade may be <5%, also no guarantee her left breast pain is related to this area and she may not get relief. If no relief, would consider referral to pain clinic. Further management pending pathology.  Pt opts for the excisional biopsy. Risk and complication of the procedure, including bleeding, infection, anesthesia discussed. Discussed procedure for magseed.   Also pt now c/o left breast pain that was referred, she has never seen a cardiologist. Will discuss with Dr. Frederick.  Pt would need medical clearance, rec stop ASA. PST. Slide review, covid testing. \par \par

## 2022-08-10 NOTE — HISTORY OF PRESENT ILLNESS
[FreeTextEntry1] : Pt is a 71 year old female here for f/u after recent MRI bx for abnormal bx, LCIS classic type. Had been on Keflex x13 days. No fever or chills. No further erythema but still has some discomfort bilat, L>R.  Pt now c.o pain since biopsy. \par \par Here with her sister, Urszula. Pt speaks Polish, sister is translating and she declines . \par Pt states she has had pain in her breasts since her mmg and more bothersome since October. Right nipple pain and Left also but recently some erythema. No masses or d.c. Started 10 day course of Keflex 4/1/22.\par Referred by Dr. Amy Frederick\par Saw Dr. Frederick and he rec continued f.u with me.\par 8/14/21, NFR, Bilat sMMG: Fg, no susp findings, nodule in UO L breast appears somewhat more prominent than prev, rec add imaging, BR0\par 8/31/21, NFR, L dMMG: FG, bilat circumscribed breast nodularity is not sig changed including a 6x4mm circumscribed nodule in RA 3:00 mid L breast noted on recent screening; L U/S: no susp solid mass, multiple subcm sized cysts noted in UOQ up to 8mm 2:00 2N, mmg 1 year, BR2\par 1/11/22, NFR, L U/S: no susp solid mass, stable 8x6mm cyst 2:00 2N, 8x6mm cyst 3:00 1N, stable subcm cysts in UOQ 3mm, 4mm and 7mm, 6x5mm cyst 9:00 2N, stable 6x4mm cyst 9:00 2N, 2.5x0.7cm benign-appearing axillary lymph node, benign-appearing 10x6mm axillary lymph node, rec mmg and u/s 6 mos, BR3\par \par 4/21/22, Sharla, MRI: R negative, L overall generalized increased enhancement, somewhat asymm ehancement in central L breast approx 12:00, bx rec, BR4B\par \par 5/9/22, L MRI bx NME 12:00 PATH: focal LCIS, FCC w/ stromal fibrosis, focal ductal hyperplasia, adenosis, sclerosing adenosis, apocrine metaplasia and microcysts, concordant, rec surg/onc mgmt\par \par Fhx: Mother with uterine cancer and brother with lung cancer.\par

## 2022-08-10 NOTE — DATA REVIEWED
[FreeTextEntry1] : 5/9/22, L MRI bx NME 12:00 PATH: focal LCIS, FCC w/ stromal fibrosis, focal ductal hyperplasia, adenosis, sclerosing adenosis, apocrine metaplasia and microcysts, concordant, rec surg/onc mgmt

## 2022-08-10 NOTE — PHYSICAL EXAM
[No Axillary Lymphadenopathy] : no left axillary lymphadenopathy [de-identified] :  Right, no longer NAC tenderness, no erythema, and no lesions. Left NAC with ?minimal induration overall but no skin changes. Tenderness now at 12:00 near site of biopsy. No evidence of post bx cellulitis or hematoma.  No underlying mass. No axillary adenopathy.

## 2022-08-19 ENCOUNTER — OUTPATIENT (OUTPATIENT)
Dept: OUTPATIENT SERVICES | Facility: HOSPITAL | Age: 72
LOS: 1 days | End: 2022-08-19

## 2022-08-19 DIAGNOSIS — C50.919 MALIGNANT NEOPLASM OF UNSPECIFIED SITE OF UNSPECIFIED FEMALE BREAST: ICD-10-CM

## 2022-08-22 ENCOUNTER — APPOINTMENT (OUTPATIENT)
Dept: HEMATOLOGY ONCOLOGY | Facility: CLINIC | Age: 72
End: 2022-08-22

## 2022-08-22 VITALS
BODY MASS INDEX: 34.39 KG/M2 | HEIGHT: 66 IN | TEMPERATURE: 97.4 F | WEIGHT: 214 LBS | DIASTOLIC BLOOD PRESSURE: 69 MMHG | HEART RATE: 57 BPM | OXYGEN SATURATION: 96 % | SYSTOLIC BLOOD PRESSURE: 122 MMHG

## 2022-08-22 PROCEDURE — 99204 OFFICE O/P NEW MOD 45 MIN: CPT

## 2022-08-22 RX ORDER — AMOXICILLIN AND CLAVULANATE POTASSIUM 875; 125 MG/1; MG/1
875-125 TABLET, COATED ORAL
Qty: 14 | Refills: 0 | Status: DISCONTINUED | COMMUNITY
Start: 2022-06-21 | End: 2022-08-22

## 2022-08-22 RX ORDER — CEPHALEXIN 500 MG/1
500 CAPSULE ORAL
Qty: 16 | Refills: 0 | Status: DISCONTINUED | COMMUNITY
Start: 2022-04-07 | End: 2022-08-22

## 2022-08-22 NOTE — CONSULT LETTER
[Dear  ___] : Dear  [unfilled], [Consult Letter:] : I had the pleasure of evaluating your patient, [unfilled]. [Please see my note below.] : Please see my note below. [Consult Closing:] : Thank you very much for allowing me to participate in the care of this patient.  If you have any questions, please do not hesitate to contact me. [Sincerely,] : Sincerely, [FreeTextEntry2] : Dr. Nati Campos [FreeTextEntry3] : Dr. Linda Rodríguez\par

## 2022-08-22 NOTE — RESULTS/DATA
[FreeTextEntry1] : Ms. Strickland is a post-menopausal female who presented at age 72 in August 2022 for evaluation of L breast LCIS. \par The patient has a medical history of cholecystectomy, MI, HTN, schizophrenia (on multiple psychiatric medications). \par \par Will obtain DEXA. \par Will make final treatment recommendation at next visit after discussion with her psychiatrist.

## 2022-08-22 NOTE — PHYSICAL EXAM
[Fully active, able to carry on all pre-disease performance without restriction] : Status 0 - Fully active, able to carry on all pre-disease performance without restriction [Normal] : grossly intact [de-identified] : generally well appearing female, NAD, polish speaking [de-identified] : s/p L breast lumpectomy, well healed, scar tissue palpable, no other abnormalities, no LAD bilaterally  [de-identified] : prior cholecystectomy scar present [de-identified] : schizophrenia, pleasant and interactive today

## 2022-08-22 NOTE — HISTORY OF PRESENT ILLNESS
[de-identified] : Referred by: Dr. Nati Campos\par Diagnosis: L breast LCIS \par PCP: Dr. Amy Frederick\par Polish Speaking \par Sister translating per patient preference Kenyetta [phone] \par \par Ms. Strickland is a post-menopausal female who presented at age 72 in 2022 for evaluation of L breast LCIS. \par The patient has a medical history of cholecystectomy, MI, HTN, schizophrenia (on multiple psychiatric medications). \par \par [place] underwent screening MMG in 2021 which revealed prominence of L breast nodule, diagnostic mammo revealed stable findings. She ultimately underwent breast MRI on 22 which revealed increased enhancement of the L breast and asymmetry, recommended biopsy. L breast biopsy was performed on 22 and notable for LCIS, classic type. She is s/p L lumpectomy with Dr. Campos on 22 which was negative for residual LCIS. She denies any breast mass, nipple discharge or nipple inversion. She did have significant discomfort during the mammogram. She is healing well after surgery. Referred for discussion of risk reduction therapy. She has a normal appetite and energy level. \par \par She currently lives with her 2 sisters who care for her. She has never had any children. She is post-menopausal. \par \par Imaging reviewed: \par 21, NFR, Bilat sMMG: Fg, no susp findings, nodule in UO L breast appears somewhat more prominent than prev, rec add imaging, BR0\par 21, NFR, L dMMG: FG, bilat circumscribed breast nodularity is not sig changed including a 6x4mm circumscribed nodule in RA 3:00 mid L breast noted on recent screening; L U/S: no susp solid mass, multiple subcm sized cysts noted in UOQ up to 8mm 2:00 2N, mmg 1 year, BR2\par 22, NFR, L U/S: no susp solid mass, stable 8x6mm cyst 2:00 2N, 8x6mm cyst 3:00 1N, stable subcm cysts in UOQ 3mm, 4mm and 7mm, 6x5mm cyst 9:00 2N, stable 6x4mm cyst 9:00 2N, 2.5x0.7cm benign-appearing axillary lymph node, benign-appearing 10x6mm axillary lymph node, rec mmg and u/s 6 mos, BR3\par 22, Sharla, MRI: R negative, L overall generalized increased enhancement, somewhat asymm enhancement in central L breast approx 12:00, bx rec, BR4B\par \par Pathology reviewed: \par 22, L breast bx: focal LCIS, classic type, FCC w/ stromal fibrosis, focal ductal hyperplasia, adenosis, sclerosing adenosis, apocrine metaplasia and microcysts\par 22: L breast lumpectomy- negative for residual LCIS, columnar cell changes, florid usual ductal hyperplasia, sclerosing adenosis, intraluminal calcifications\par \par Genetics: not indicated \par \par HCM: \par - COVID vaccination: s/p 2 doses, 1 booster \par - Colonoscopy: done , no longer screening \par - Gyn: no longer screening, gets pelvic US \par - Mammo: screens annually \par - DEXA: 2-3 years ago, osteoporosis, s/p bisphosphonates x 5 years \par \par SH: \par - Occupation: not working \par - Living situation:  lives in [place] with her sister \par - Smoking/etoh/illicits: remote smoker, denies etoh, denies illicits \par - Exercise: has been less physically active recently \par \par OB/GYN Hx: \par - Age of menarche: 12\par - Age of menopause: 43\par - Pregnancy history: \par \par FH: \par - Mother with uterine cancer and brother with lung cancer.

## 2022-09-01 ENCOUNTER — APPOINTMENT (OUTPATIENT)
Dept: BREAST CENTER | Facility: CLINIC | Age: 72
End: 2022-09-01

## 2022-09-15 ENCOUNTER — OUTPATIENT (OUTPATIENT)
Dept: OUTPATIENT SERVICES | Facility: HOSPITAL | Age: 72
LOS: 1 days | End: 2022-09-15

## 2022-09-15 DIAGNOSIS — C50.919 MALIGNANT NEOPLASM OF UNSPECIFIED SITE OF UNSPECIFIED FEMALE BREAST: ICD-10-CM

## 2022-09-19 ENCOUNTER — APPOINTMENT (OUTPATIENT)
Dept: HEMATOLOGY ONCOLOGY | Facility: CLINIC | Age: 72
End: 2022-09-19

## 2022-09-19 VITALS
DIASTOLIC BLOOD PRESSURE: 73 MMHG | TEMPERATURE: 97.5 F | OXYGEN SATURATION: 96 % | HEIGHT: 66 IN | HEART RATE: 62 BPM | SYSTOLIC BLOOD PRESSURE: 133 MMHG | BODY MASS INDEX: 34.07 KG/M2 | WEIGHT: 212 LBS

## 2022-09-19 PROCEDURE — 99214 OFFICE O/P EST MOD 30 MIN: CPT

## 2022-09-19 NOTE — RESULTS/DATA
[FreeTextEntry1] : Ms. Strickland is a post-menopausal female who presented at age 72 in August 2022 for evaluation of L breast LCIS. \par The patient has a medical history of cholecystectomy, MI, HTN, schizophrenia (on multiple psychiatric medications). \par \par Discussed with the patient the natural history and prognosis of disorders like ADH, ALH, DCIS and LCIS. I explained that the NSABP P-1 trial for breast cancer prevention, which included women at increased risks for breast cancer by age greater than 60 or a Yasmine risk model of greater than 1.66 of breast cancer over 5 years, or any woman over age 35 with LCIS), tamoxifen 20mg daily decreased the relative risk of invasive cancer by 43% and the risk of noninvasive cancer by 37%. I explained that tamoxifen has not been shown to improve survival in this setting. We discussed that the TAM01 study released results at [facility] in December 2018 showing that low dose tamoxifen (5 mg daily) for 3 years compared with placebo effectively reduced the risk of developing breast cancer in this patient population by 52%, with no increase in the incidence of VTE or uterine cancer. We discussed that the patients taking low dose tamoxifen did not have a significant increase in hot flashes when assess for severity. She was informed that if she does have hot flashes, we can treat her with low dose oxybutynin. The patient was informed that when the low dose tamoxifen was studied, there was no increase in incidence of vaginal dryness or arthralgias compared with placebo. \par  \par The patient agreed to take tamoxifen 5 mg PO daily for 3 years. She will continue to follow up with breast surgery and have regular imaging.\par \par Will obtain DEXA. \par Start low dose tamoxifen. Wellbutrin held- she is doing well- no major changes per her sister. \par F/U 6 weeks.

## 2022-09-19 NOTE — HISTORY OF PRESENT ILLNESS
[de-identified] : Referred by: Dr. Nati Campos\par Diagnosis: L breast LCIS \par PCP: Dr. Amy Frederick\par Polish Speaking \par Sister translating per patient preference Kenyetta [phone] \par \par Ms. Strickland is a post-menopausal female who presented at age 72 in 2022 for evaluation of L breast LCIS. \par The patient has a medical history of cholecystectomy, MI, HTN, schizophrenia (on multiple psychiatric medications). \par \par [place] underwent screening MMG in 2021 which revealed prominence of L breast nodule, diagnostic mammo revealed stable findings. She ultimately underwent breast MRI on 22 which revealed increased enhancement of the L breast and asymmetry, recommended biopsy. L breast biopsy was performed on 22 and notable for LCIS, classic type. She is s/p L lumpectomy with Dr. Campos on 22 which was negative for residual LCIS. She denies any breast mass, nipple discharge or nipple inversion. She did have significant discomfort during the mammogram. She is healing well after surgery. Referred for discussion of risk reduction therapy. She has a normal appetite and energy level. \par \par She currently lives with her 2 sisters who care for her. She has never had any children. She is post-menopausal. \par \par Imaging reviewed: \par 21, NFR, Bilat sMMG: Fg, no susp findings, nodule in UO L breast appears somewhat more prominent than prev, rec add imaging, BR0\par 21, NFR, L dMMG: FG, bilat circumscribed breast nodularity is not sig changed including a 6x4mm circumscribed nodule in RA 3:00 mid L breast noted on recent screening; L U/S: no susp solid mass, multiple subcm sized cysts noted in UOQ up to 8mm 2:00 2N, mmg 1 year, BR2\par 22, NFR, L U/S: no susp solid mass, stable 8x6mm cyst 2:00 2N, 8x6mm cyst 3:00 1N, stable subcm cysts in UOQ 3mm, 4mm and 7mm, 6x5mm cyst 9:00 2N, stable 6x4mm cyst 9:00 2N, 2.5x0.7cm benign-appearing axillary lymph node, benign-appearing 10x6mm axillary lymph node, rec mmg and u/s 6 mos, BR3\par 22, Sharla, MRI: R negative, L overall generalized increased enhancement, somewhat asymm enhancement in central L breast approx 12:00, bx rec, BR4B\par \par Pathology reviewed: \par 22, L breast bx: focal LCIS, classic type, FCC w/ stromal fibrosis, focal ductal hyperplasia, adenosis, sclerosing adenosis, apocrine metaplasia and microcysts\par 22: L breast lumpectomy- negative for residual LCIS, columnar cell changes, florid usual ductal hyperplasia, sclerosing adenosis, intraluminal calcifications\par \par SH: \par - Occupation: not working \par - Living situation:  lives in [place] with her sister \par - Smoking/etoh/illicits: remote smoker, denies etoh, denies illicits \par - Exercise: has been less physically active recently \par \par OB/GYN Hx: \par - Age of menarche: 12\par - Age of menopause: 43\par - Pregnancy history: \par \par FH: \par - Mother with uterine cancer and brother with lung cancer. [de-identified] : [place] presents for follow up for L breast LCIS on 9/19/22. \par Her sister prefers to translate.\par \par Spoke with her psychiatrist- he is willing to hold her wellbutrin so we can try a low dose tamoxifen. \par Office [phone] [phone] Dr. Gutierrez \par \par At today's visit, she reports pain in the L breast, especially pain around the nipple. She stopped her wellbutrin on Friday. She denies fevers, chills, CP, SOB, n/v/d. \par DEXA 11/2020- normal bone density \par \par Health Care Maintenance: Updated 9/19/22\par Mammogram: due November 2022 \par Colonoscopy: done 2022, no longer screening \par Pap smear: no longer screening, gets pelvic US \par Dentist: discuss at next visit \par PCP: Dr. Riley \par Dermatology screen: discuss at next visit \par Genetic screen: not indicated \par DEXA:  DEXA 11/2020- normal bone density, s/p bisphosphonates x 5 years \par \par Vaccinations: \par COVID vaccination: s/p 2 doses, 1 booster \par Flu vaccine: due this fall

## 2022-09-19 NOTE — PHYSICAL EXAM
[Fully active, able to carry on all pre-disease performance without restriction] : Status 0 - Fully active, able to carry on all pre-disease performance without restriction [Normal] : grossly intact [de-identified] : generally well appearing female, NAD, polish speaking [de-identified] : s/p L breast lumpectomy, well healed, scar tissue palpable, no other abnormalities, no LAD bilaterally  [de-identified] : prior cholecystectomy scar present [de-identified] : schizophrenia, pleasant and interactive today

## 2022-09-20 ENCOUNTER — NON-APPOINTMENT (OUTPATIENT)
Age: 72
End: 2022-09-20

## 2022-09-20 DIAGNOSIS — M85.80 OTHER SPECIFIED DISORDERS OF BONE DENSITY AND STRUCTURE, UNSPECIFIED SITE: ICD-10-CM

## 2022-09-27 ENCOUNTER — NON-APPOINTMENT (OUTPATIENT)
Age: 72
End: 2022-09-27

## 2022-10-25 ENCOUNTER — OUTPATIENT (OUTPATIENT)
Dept: OUTPATIENT SERVICES | Facility: HOSPITAL | Age: 72
LOS: 1 days | End: 2022-10-25

## 2022-10-25 DIAGNOSIS — C50.919 MALIGNANT NEOPLASM OF UNSPECIFIED SITE OF UNSPECIFIED FEMALE BREAST: ICD-10-CM

## 2022-10-31 ENCOUNTER — APPOINTMENT (OUTPATIENT)
Dept: HEMATOLOGY ONCOLOGY | Facility: CLINIC | Age: 72
End: 2022-10-31

## 2022-10-31 VITALS
WEIGHT: 214 LBS | HEIGHT: 66 IN | DIASTOLIC BLOOD PRESSURE: 77 MMHG | HEART RATE: 65 BPM | BODY MASS INDEX: 34.39 KG/M2 | OXYGEN SATURATION: 96 % | SYSTOLIC BLOOD PRESSURE: 127 MMHG | TEMPERATURE: 98.6 F

## 2022-10-31 PROCEDURE — 99214 OFFICE O/P EST MOD 30 MIN: CPT

## 2022-10-31 RX ORDER — BUPROPION HYDROCHLORIDE 300 MG/1
300 TABLET, EXTENDED RELEASE ORAL
Refills: 0 | Status: DISCONTINUED | COMMUNITY
End: 2022-10-31

## 2022-10-31 NOTE — RESULTS/DATA
[FreeTextEntry1] : Ms. Strickland is a post-menopausal female who presented at age 72 in August 2022 for evaluation of L breast LCIS. \par The patient has a medical history of cholecystectomy, MI, HTN, schizophrenia (on multiple psychiatric medications). \par \par Discussed with the patient the natural history and prognosis of disorders like ADH, ALH, DCIS and LCIS. I explained that the NSABP P-1 trial for breast cancer prevention, which included women at increased risks for breast cancer by age greater than 60 or a Yasmine risk model of greater than 1.66 of breast cancer over 5 years, or any woman over age 35 with LCIS), tamoxifen 20mg daily decreased the relative risk of invasive cancer by 43% and the risk of noninvasive cancer by 37%. I explained that tamoxifen has not been shown to improve survival in this setting. We discussed that the TAM01 study released results at [facility] in December 2018 showing that low dose tamoxifen (5 mg daily) for 3 years compared with placebo effectively reduced the risk of developing breast cancer in this patient population by 52%, with no increase in the incidence of VTE or uterine cancer. We discussed that the patients taking low dose tamoxifen did not have a significant increase in hot flashes when assess for severity. She was informed that if she does have hot flashes, we can treat her with low dose oxybutynin. The patient was informed that when the low dose tamoxifen was studied, there was no increase in incidence of vaginal dryness or arthralgias compared with placebo. \par  \par The patient agreed to take tamoxifen 5 mg PO daily for 3 years. She will continue to follow up with breast surgery and have regular imaging.\par \par Wellbutrin held- she is doing well- no major changes per her sister. \par Initiated low dose tamoxifen 9/20/22- tolerating well. \par Due for MMG in November. Pending DEXA. Derm referral. \par F/U 3 mo with JAYCEE Malone.

## 2022-10-31 NOTE — HISTORY OF PRESENT ILLNESS
[de-identified] : Referred by: Dr. Nati Campos\par Diagnosis: L breast LCIS \par PCP: Dr. Amy Frederick\par Polish Speaking \par Sister translating per patient preference [place] [phone] \par \par Ms. Strickland is a post-menopausal female who presented at age 72 in 2022 for evaluation of L breast LCIS. \par The patient has a medical history of cholecystectomy, MI, HTN, schizophrenia (on multiple psychiatric medications). \par \par [place] underwent screening MMG in 2021 which revealed prominence of L breast nodule, diagnostic mammo revealed stable findings. She ultimately underwent breast MRI on 22 which revealed increased enhancement of the L breast and asymmetry, recommended biopsy. L breast biopsy was performed on 22 and notable for LCIS, classic type. She is s/p L lumpectomy with Dr. Campos on 22 which was negative for residual LCIS. She denies any breast mass, nipple discharge or nipple inversion. She did have significant discomfort during the mammogram. She is healing well after surgery. Referred for discussion of risk reduction therapy. She has a normal appetite and energy level. \par \par She currently lives with her 2 sisters who care for her. She has never had any children. She is post-menopausal. \par \par Imaging reviewed: \par 21, NFR, Bilat sMMG: Fg, no susp findings, nodule in UO L breast appears somewhat more prominent than prev, rec add imaging, BR0\par 21, NFR, L dMMG: FG, bilat circumscribed breast nodularity is not sig changed including a 6x4mm circumscribed nodule in RA 3:00 mid L breast noted on recent screening; L U/S: no susp solid mass, multiple subcm sized cysts noted in UOQ up to 8mm 2:00 2N, mmg 1 year, BR2\par 22, NFR, L U/S: no susp solid mass, stable 8x6mm cyst 2:00 2N, 8x6mm cyst 3:00 1N, stable subcm cysts in UOQ 3mm, 4mm and 7mm, 6x5mm cyst 9:00 2N, stable 6x4mm cyst 9:00 2N, 2.5x0.7cm benign-appearing axillary lymph node, benign-appearing 10x6mm axillary lymph node, rec mmg and u/s 6 mos, BR3\par 22, Sharla, MRI: R negative, L overall generalized increased enhancement, somewhat asymm enhancement in central L breast approx 12:00, bx rec, BR4B\par \par Pathology reviewed: \par 22, L breast bx: focal LCIS, classic type, FCC w/ stromal fibrosis, focal ductal hyperplasia, adenosis, sclerosing adenosis, apocrine metaplasia and microcysts\par 22: L breast lumpectomy- negative for residual LCIS, columnar cell changes, florid usual ductal hyperplasia, sclerosing adenosis, intraluminal calcifications\par \par SH: \par - Occupation: not working \par - Living situation:  lives in [place] with her sister \par - Smoking/etoh/illicits: remote smoker, denies etoh, denies illicits \par - Exercise: has been less physically active recently \par \par OB/GYN Hx: \par - Age of menarche: 12\par - Age of menopause: 43\par - Pregnancy history: \par \par FH: \par - Mother with uterine cancer and brother with lung cancer. [de-identified] : [place] presents for follow up for L breast LCIS on 10/31/22. \par Her sister prefers to translate.\par \par Spoke with her psychiatrist- he is willing to hold her wellbutrin so we can try a low dose tamoxifen. \par Office [phone] [phone] Dr. Gutierrez \par \par At today's visit she is generally feeling well. She initiated low dose tamoxifen on 9/2022. She denies any side effects including hot flashes. She has discomfort in her L breast and feels she needs surgery to remove it. She is still pending DEXA and repeat mammogram in November.  She denies fevers, chills, CP, SOB, n/v/d. \par \par Health Care Maintenance: Updated 10/31/22 \par Mammogram: due November 2022 \par Colonoscopy: done 2022, no longer screening \par Pap smear: no longer screening, gets pelvic US \par Dentist: UTD, has dentures \par PCP: Dr. Riley \par Dermatology screen: not yet done \par Genetic screen: not indicated \par DEXA:  DEXA 11/2020- normal bone density, s/p bisphosphonates x 5 years \par \par Vaccinations: \par COVID vaccination: s/p 2 doses, 1 booster \par Flu vaccine: due this fall

## 2022-10-31 NOTE — PHYSICAL EXAM
[Fully active, able to carry on all pre-disease performance without restriction] : Status 0 - Fully active, able to carry on all pre-disease performance without restriction [Normal] : grossly intact [de-identified] : generally well appearing female, NAD, polish speaking [de-identified] : s/p L breast lumpectomy, well healed, scar tissue palpable, no other abnormalities, no LAD bilaterally  [de-identified] : prior cholecystectomy scar present [de-identified] : schizophrenia, pleasant and interactive today

## 2022-12-01 ENCOUNTER — APPOINTMENT (OUTPATIENT)
Dept: RADIOLOGY | Facility: CLINIC | Age: 72
End: 2022-12-01

## 2022-12-01 PROCEDURE — 77080 DXA BONE DENSITY AXIAL: CPT

## 2022-12-02 ENCOUNTER — NON-APPOINTMENT (OUTPATIENT)
Age: 72
End: 2022-12-02

## 2022-12-22 ENCOUNTER — APPOINTMENT (OUTPATIENT)
Dept: MAMMOGRAPHY | Facility: CLINIC | Age: 72
End: 2022-12-22

## 2022-12-22 ENCOUNTER — RESULT REVIEW (OUTPATIENT)
Age: 72
End: 2022-12-22

## 2022-12-22 PROCEDURE — G0279: CPT

## 2022-12-22 PROCEDURE — 77066 DX MAMMO INCL CAD BI: CPT

## 2022-12-24 ENCOUNTER — NON-APPOINTMENT (OUTPATIENT)
Age: 72
End: 2022-12-24

## 2023-01-16 ENCOUNTER — APPOINTMENT (OUTPATIENT)
Dept: BREAST CENTER | Facility: CLINIC | Age: 73
End: 2023-01-16
Payer: MEDICARE

## 2023-01-16 VITALS
DIASTOLIC BLOOD PRESSURE: 77 MMHG | HEART RATE: 65 BPM | HEIGHT: 66 IN | BODY MASS INDEX: 34.39 KG/M2 | WEIGHT: 214 LBS | SYSTOLIC BLOOD PRESSURE: 150 MMHG | TEMPERATURE: 97.3 F

## 2023-01-16 DIAGNOSIS — Z00.00 ENCOUNTER FOR GENERAL ADULT MEDICAL EXAMINATION W/OUT ABNORMAL FINDINGS: ICD-10-CM

## 2023-01-16 DIAGNOSIS — Z91.89 OTHER SPECIFIED PERSONAL RISK FACTORS, NOT ELSEWHERE CLASSIFIED: ICD-10-CM

## 2023-01-16 PROCEDURE — 99213 OFFICE O/P EST LOW 20 MIN: CPT

## 2023-01-16 RX ORDER — LAMOTRIGINE 200 MG/1
200 TABLET ORAL
Qty: 90 | Refills: 0 | Status: DISCONTINUED | COMMUNITY
Start: 2022-03-01 | End: 2023-01-16

## 2023-01-16 RX ORDER — LACTULOSE 10 G/15ML
10 SOLUTION ORAL
Qty: 1419 | Refills: 0 | Status: DISCONTINUED | COMMUNITY
Start: 2022-04-18 | End: 2023-01-16

## 2023-01-16 RX ORDER — LAMOTRIGINE 100 MG/1
100 TABLET ORAL
Qty: 90 | Refills: 0 | Status: DISCONTINUED | COMMUNITY
Start: 2022-03-01 | End: 2023-01-16

## 2023-01-16 RX ORDER — MELOXICAM 15 MG/1
15 TABLET ORAL
Qty: 30 | Refills: 0 | Status: DISCONTINUED | COMMUNITY
Start: 2022-03-31 | End: 2023-01-16

## 2023-01-16 RX ORDER — ARIPIPRAZOLE 10 MG/1
10 TABLET, ORALLY DISINTEGRATING ORAL
Refills: 0 | Status: ACTIVE | COMMUNITY

## 2023-01-16 RX ORDER — ARIPIPRAZOLE 5 MG/1
5 TABLET ORAL
Qty: 30 | Refills: 0 | Status: DISCONTINUED | COMMUNITY
Start: 2022-07-25 | End: 2023-01-16

## 2023-01-16 RX ORDER — ASENAPINE MALEATE 2.5 MG/1
2.5 TABLET SUBLINGUAL
Refills: 0 | Status: DISCONTINUED | COMMUNITY
End: 2023-01-16

## 2023-01-16 RX ORDER — PANTOPRAZOLE SODIUM 40 MG/1
40 GRANULE, DELAYED RELEASE ORAL
Refills: 0 | Status: DISCONTINUED | COMMUNITY
End: 2023-01-16

## 2023-01-16 RX ORDER — LITHIUM CARBONATE 300 MG/1
300 TABLET, FILM COATED, EXTENDED RELEASE ORAL
Refills: 0 | Status: DISCONTINUED | COMMUNITY
End: 2023-01-16

## 2023-01-16 RX ORDER — ASENAPINE 5 MG/1
5 TABLET SUBLINGUAL
Qty: 90 | Refills: 0 | Status: DISCONTINUED | COMMUNITY
Start: 2022-01-05 | End: 2023-01-16

## 2023-01-16 NOTE — ASSESSMENT
[FreeTextEntry1] : Pt is a 72 year old female here for follow up. She is s/p 6/16/22 Left breast excisional biopsy surgical path: prior bx scar, both markers, clip and magseed seen, no residual LCIS. \par \par Here with her sister, Urszula. Pt speaks Polish, sister is translating and she declines .\par Patient seeing Dr. Rodríguez, on tamoxifen 5mg po x 3years for risk reduction which she started 9/2022.\par Referred by Dr. Amy Frederick\par \par 8/14/21, NFR, Bilat sMMG: Fg, no susp findings, nodule in UO L breast appears somewhat more prominent than prev, rec add imaging, BR0\par 8/31/21, NFR, L dMMG: FG, bilat circumscribed breast nodularity is not sig changed including a 6x4mm circumscribed nodule in RA 3:00 mid L breast noted on recent screening; L U/S: no susp solid mass, multiple subcm sized cysts noted in UOQ up to 8mm 2:00 2N, mmg 1 year, BR2\par 1/11/22, NFR, L U/S: no susp solid mass, stable 8x6mm cyst 2:00 2N, 8x6mm cyst 3:00 1N, stable subcm cysts in UOQ 3mm, 4mm and 7mm, 6x5mm cyst 9:00 2N, stable 6x4mm cyst 9:00 2N, 2.5x0.7cm benign-appearing axillary lymph node, benign-appearing 10x6mm axillary lymph node, rec mmg and u/s 6 mos, BR3\par 4/21/22, Sharla, MRI: R negative, L overall generalized increased enhancement, somewhat asymm enhancement in central L breast approx 12:00, bx rec, BR4B\par 5/9/22, L MRI bx NME 12:00 PATH: focal LCIS, FCC w/ stromal fibrosis, focal ductal hyperplasia, adenosis, sclerosing adenosis, apocrine metaplasia and microcysts, concordant, rec surg/onc mgmt\par \par 12/22/22 Sharla, rodger dMMG: TC 32.1%. FG density. No susp mass, microcals or other sign of malignancy is identified. Stable bilateral nodularity and calcifications. Rec mmg in 1yr. BR2\par \par Fhx: Mother with uterine cancer and brother with lung cancer.\par CBE: 36D, left UOQ scar well healed. NICOLE. No axillary or SC lymphadenopathy.\par Reviewed CBE and recent mmg with pt. Discussed no suspicious findings. Also she has had complaints of left breast prior to surgery. Rec NSAIDs or tylenol PRN. PT on tamoxifen per Dr. Rodríguez.\par Rec MRI 6/23 and then f.u after.

## 2023-01-16 NOTE — HISTORY OF PRESENT ILLNESS
[FreeTextEntry1] : Pt is a 72 year old female here for follow up. She is s/p 6/16/22 Left breast excisional biopsy surgical path: prior bx scar, both markers, clip and magseed seen, no residual LCIS. \par C/o of some discomfort in left breast but she was complaining about it preoperatively.\par \par Here with her sister, Urszula. Pt speaks Polish, sister is translating and she declines .\par Patient seeing Dr. Rodríguez, on tamoxifen 5mg po x 3years for risk reduction which she started 9/2022.\par Referred by Dr. Amy Frederick\par \par 4/21/22, Sharla, MRI: R negative, L overall generalized increased enhancement, somewhat asymm enhancement in central L breast approx 12:00, bx rec, BR4B\par 5/9/22, L MRI bx NME 12:00 PATH: focal LCIS, FCC w/ stromal fibrosis, focal ductal hyperplasia, adenosis, sclerosing adenosis, apocrine metaplasia and microcysts, concordant, rec surg/onc mgmt\par \par 12/22/22 rodger Magdaleno dMMG: TC 32.1%. FG density. No susp mass, microcals or other sign of malignancy is identified. Stable bilateral nodularity and calcifications. Rec mmg in 1yr. BR2\par \par Fhx: Mother with uterine cancer and brother with lung cancer.

## 2023-01-16 NOTE — DATA REVIEWED
[FreeTextEntry1] : 12/22/22 rodger Magdaleno dMMG: TC 32.1%. FG density. No susp mass, microcals or other sign of malignancy is identified. Stable bilateral nodularity and calcifications. Rec mmg in 1yr. BR2\par

## 2023-01-16 NOTE — PHYSICAL EXAM
[Normocephalic] : normocephalic [Atraumatic] : atraumatic [Supple] : supple [No Supraclavicular Adenopathy] : no supraclavicular adenopathy [Examined in the supine and seated position] : examined in the supine and seated position [Symmetrical] : symmetrical [Bra Size: ___] : Bra Size: [unfilled] [No dominant masses] : no dominant masses in right breast  [No dominant masses] : no dominant masses left breast [No Nipple Retraction] : no left nipple retraction [No Nipple Discharge] : no left nipple discharge [No Axillary Lymphadenopathy] : no left axillary lymphadenopathy [No Edema] : no edema [No Swelling] : no swelling [Full ROM] : full range of motion [No Rashes] : no rashes [No Ulceration] : no ulceration [de-identified] : Well healed UOQ scar

## 2023-01-25 ENCOUNTER — OUTPATIENT (OUTPATIENT)
Dept: OUTPATIENT SERVICES | Facility: HOSPITAL | Age: 73
LOS: 1 days | End: 2023-01-25

## 2023-01-25 DIAGNOSIS — C50.919 MALIGNANT NEOPLASM OF UNSPECIFIED SITE OF UNSPECIFIED FEMALE BREAST: ICD-10-CM

## 2023-01-30 ENCOUNTER — APPOINTMENT (OUTPATIENT)
Dept: HEMATOLOGY ONCOLOGY | Facility: CLINIC | Age: 73
End: 2023-01-30
Payer: MEDICARE

## 2023-01-30 VITALS
SYSTOLIC BLOOD PRESSURE: 152 MMHG | OXYGEN SATURATION: 99 % | HEIGHT: 66 IN | BODY MASS INDEX: 34.01 KG/M2 | HEART RATE: 63 BPM | TEMPERATURE: 97.6 F | DIASTOLIC BLOOD PRESSURE: 81 MMHG | WEIGHT: 211.6 LBS

## 2023-01-30 PROCEDURE — 99214 OFFICE O/P EST MOD 30 MIN: CPT

## 2023-01-30 NOTE — PHYSICAL EXAM
[Fully active, able to carry on all pre-disease performance without restriction] : Status 0 - Fully active, able to carry on all pre-disease performance without restriction [Normal] : grossly intact [de-identified] : generally well appearing female, NAD, polish speaking [de-identified] : s/p L breast lumpectomy, well healed, scar tissue palpable, no other abnormalities, no LAD bilaterally  [de-identified] : prior cholecystectomy scar present [de-identified] : schizophrenia, pleasant and interactive today

## 2023-01-30 NOTE — HISTORY OF PRESENT ILLNESS
[de-identified] : Referred by: Dr. Nati Campos\par Diagnosis: L breast LCIS \par PCP: Dr. Amy Frederick\par Polish Speaking \par Sister translating per patient preference [place] [phone] \par \par Ms. Strickland is a post-menopausal female who initially presented at age 72 in 2022 for evaluation of L breast LCIS. \par The patient has a medical history of cholecystectomy, MI, HTN, schizophrenia (on multiple psychiatric medications). \par \par [place] underwent screening MMG in 2021 which revealed prominence of L breast nodule, diagnostic mammo revealed stable findings. She ultimately underwent breast MRI on 22 which revealed increased enhancement of the L breast and asymmetry, recommended biopsy. L breast biopsy was performed on 22 and notable for LCIS, classic type. She is s/p L lumpectomy with Dr. Campos on 22 which was negative for residual LCIS. She denies any breast mass, nipple discharge or nipple inversion. She did have significant discomfort during the mammogram. She is healing well after surgery. Referred for discussion of risk reduction therapy. She has a normal appetite and energy level. \par \par She currently lives with her 2 sisters who care for her. She has never had any children. She is post-menopausal. \par \par Imaging reviewed: \par 21, NFR, Bilat sMMG: Fg, no susp findings, nodule in UO L breast appears somewhat more prominent than prev, rec add imaging, BR0\par 21, NFR, L dMMG: FG, bilat circumscribed breast nodularity is not sig changed including a 6x4mm circumscribed nodule in RA 3:00 mid L breast noted on recent screening; L U/S: no susp solid mass, multiple subcm sized cysts noted in UOQ up to 8mm 2:00 2N, mmg 1 year, BR2\par 22, NFR, L U/S: no susp solid mass, stable 8x6mm cyst 2:00 2N, 8x6mm cyst 3:00 1N, stable subcm cysts in UOQ 3mm, 4mm and 7mm, 6x5mm cyst 9:00 2N, stable 6x4mm cyst 9:00 2N, 2.5x0.7cm benign-appearing axillary lymph node, benign-appearing 10x6mm axillary lymph node, rec mmg and u/s 6 mos, BR3\par 22, Shrala, MRI: R negative, L overall generalized increased enhancement, somewhat asymm enhancement in central L breast approx 12:00, bx rec, BR4B\par \par Pathology reviewed: \par 22, L breast bx: focal LCIS, classic type, FCC w/ stromal fibrosis, focal ductal hyperplasia, adenosis, sclerosing adenosis, apocrine metaplasia and microcysts\par 22: L breast lumpectomy- negative for residual LCIS, columnar cell changes, florid usual ductal hyperplasia, sclerosing adenosis, intraluminal calcifications\par \par SH: \par - Occupation: not working \par - Living situation:  lives in [place] with her sister \par - Smoking/etoh/illicits: remote smoker, denies etoh, denies illicits \par - Exercise: has been less physically active recently \par \par OB/GYN Hx: \par - Age of menarche: 12\par - Age of menopause: 43\par - Pregnancy history: \par \par FH: \par - Mother with uterine cancer and brother with lung cancer. [de-identified] : [place] presents for follow up for L breast LCIS on 10/31/22. \par Accompanied by her sister (whom pt prefers to translate).\par \par Previously spoke with her psychiatrist - he is willing to hold her wellbutrin so we can try a low dose tamoxifen. \par Office [phone] [phone] Dr. Gutierrez \par \par At today's visit she is generally feeling well.  Notes some hot flushes since starting Tamoxifen (2x/day, not significantly bothersome).  Also with occasional nausea after taking her medications (takes with pantoprazole). She initiated low dose tamoxifen on 9/2022. Still  has discomfort in her L breast which is unchanged.  Had DEXA after last visit.  She denies fevers, chills, CP, SOB, n/v/d. \par \par Health Care Maintenance: Updated 1/30/23 \par Mammogram:  12/24/22 (Birads 2), breast MRI planned 6/23\par Colonoscopy: done 2022, no longer screening \par Pap smear: no longer screening, gets pelvic US \par Dentist: UTD, has dentures \par PCP: Dr. Riley \par Dermatology screen: 1/23 -  continue annually\par Genetic screen: not indicated \par DEXA 11/2020- normal bone density, s/p bisphosphonates x 5 years; repeated 12/2/22 - normal BMD\par \par Vaccinations: \par COVID vaccination: s/p 2 doses, 1 booster \par Flu vaccine: Fall 22

## 2023-01-30 NOTE — RESULTS/DATA
[FreeTextEntry1] : Ms. Strickland is a post-menopausal female who initially presented at age 72 in August 2022 for evaluation of L breast LCIS. \par The patient has a medical history of cholecystectomy, MI, HTN, schizophrenia (on multiple psychiatric medications). \par \par Discussed with the patient the natural history and prognosis of disorders like ADH, ALH, DCIS and LCIS. I explained that the NSABP P-1 trial for breast cancer prevention, which included women at increased risks for breast cancer by age greater than 60 or a Yasmine risk model of greater than 1.66 of breast cancer over 5 years, or any woman over age 35 with LCIS), tamoxifen 20mg daily decreased the relative risk of invasive cancer by 43% and the risk of noninvasive cancer by 37%. I explained that tamoxifen has not been shown to improve survival in this setting. We discussed that the TAM01 study released results at [facility] in December 2018 showing that low dose tamoxifen (5 mg daily) for 3 years compared with placebo effectively reduced the risk of developing breast cancer in this patient population by 52%, with no increase in the incidence of VTE or uterine cancer. We discussed that the patients taking low dose tamoxifen did not have a significant increase in hot flashes when assess for severity. She was informed that if she does have hot flashes, we can treat her with low dose oxybutynin. The patient was informed that when the low dose tamoxifen was studied, there was no increase in incidence of vaginal dryness or arthralgias compared with placebo. \par  \par The patient agreed to take tamoxifen 5 mg PO daily for 3 years. She will continue to follow up with breast surgery and have regular imaging.\par \par Wellbutrin held- she is doing well- no major changes per her sister. \par Initiated low dose tamoxifen 9/20/22- tolerating well except for mild hot flushes, occasional nausea after taking

## 2023-06-19 ENCOUNTER — APPOINTMENT (OUTPATIENT)
Dept: MRI IMAGING | Facility: CLINIC | Age: 73
End: 2023-06-19
Payer: MEDICARE

## 2023-06-19 PROCEDURE — 77049 MRI BREAST C-+ W/CAD BI: CPT

## 2023-06-19 PROCEDURE — A9585: CPT

## 2023-06-20 ENCOUNTER — NON-APPOINTMENT (OUTPATIENT)
Age: 73
End: 2023-06-20

## 2023-07-10 ENCOUNTER — APPOINTMENT (OUTPATIENT)
Dept: BREAST CENTER | Facility: CLINIC | Age: 73
End: 2023-07-10
Payer: MEDICARE

## 2023-07-10 ENCOUNTER — RESULT REVIEW (OUTPATIENT)
Age: 73
End: 2023-07-10

## 2023-07-10 VITALS
HEIGHT: 66 IN | DIASTOLIC BLOOD PRESSURE: 75 MMHG | BODY MASS INDEX: 34.39 KG/M2 | TEMPERATURE: 97 F | HEART RATE: 88 BPM | SYSTOLIC BLOOD PRESSURE: 147 MMHG | WEIGHT: 214 LBS | OXYGEN SATURATION: 99 % | RESPIRATION RATE: 16 BRPM

## 2023-07-10 DIAGNOSIS — Z78.9 OTHER SPECIFIED HEALTH STATUS: ICD-10-CM

## 2023-07-10 PROCEDURE — 99214 OFFICE O/P EST MOD 30 MIN: CPT

## 2023-07-10 NOTE — HISTORY OF PRESENT ILLNESS
Fill?  
[FreeTextEntry1] : Referred by Dr. Amy Frederick\par \par Pt is a 73 year old female here for follow up. She is s/p 6/16/22 Left breast excisional biopsy surgical path for LCIS: prior bx scar, both markers, clip and magseed seen, no residual LCIS. \par \par Here with her sister, Urszula. Pt speaks Polish, sister is translating and she declines .\par Patient seeing Dr. Rodríguez, on tamoxifen 5mg po for risk reduction which she started 9/2022. Occasional hot flashes but overall tolerating well. \par \par 12/22/22 rodger Magdaleno dMMG: TC 32.1%. FG density. No susp mass, microcals or other sign of malignancy is identified. Stable bilateral nodularity and calcifications. Rec mmg in 1yr. BR2\par \par 6/19/23 NFR, MRI: No susp enhancement bilaterally. Axilla- negative. No MRI evidence of malignancy. Resume mmg on schedule. BR2\par \par Fhx: Mother with uterine cancer and brother with lung cancer.

## 2023-07-10 NOTE — PHYSICAL EXAM
[Normocephalic] : normocephalic [Atraumatic] : atraumatic [Supple] : supple [No Supraclavicular Adenopathy] : no supraclavicular adenopathy [Examined in the supine and seated position] : examined in the supine and seated position [Symmetrical] : symmetrical [Bra Size: ___] : Bra Size: [unfilled] [No dominant masses] : no dominant masses in right breast  [No dominant masses] : no dominant masses left breast [No Nipple Retraction] : no left nipple retraction [No Nipple Discharge] : no left nipple discharge [No Axillary Lymphadenopathy] : no left axillary lymphadenopathy [No Edema] : no edema [No Swelling] : no swelling [Full ROM] : full range of motion [No Rashes] : no rashes [No Ulceration] : no ulceration [de-identified] : Well healed UOQ scar

## 2023-07-10 NOTE — DATA REVIEWED
[FreeTextEntry1] : 6/19/23 NFR, MRI: No susp enhancement bilaterally. Axilla- negative. No MRI evidence of malignancy. Resume mmg on schedule. BR2\par

## 2023-07-10 NOTE — ASSESSMENT
[FreeTextEntry1] : Referred by Dr. Amy Frederick\par \par Pt is a 73 year old female here for follow up. She is s/p 6/16/22 Left breast excisional biopsy surgical path: prior bx scar, both markers, clip and magseed seen, no residual LCIS. \par \par Here with her sister, Urszula. Pt speaks Polish, sister is translating and she declines .\par Patient seeing Dr. Rodríguez, on tamoxifen 5mg po for risk reduction which she started 9/2022. Occasional hot flashes but overall tolerating well. \par \par 12/22/22 rodger Magdaleno dMMG: TC 32.1%. FG density. No susp mass, microcals or other sign of malignancy is identified. Stable bilateral nodularity and calcifications. Rec mmg in 1yr. BR2\par \par 6/19/23 NFR, MRI: No susp enhancement bilaterally. Axilla- negative. No MRI evidence of malignancy. Resume mmg on schedule. BR2\par \par Fhx: Mother with uterine cancer and brother with lung cancer.\par \par CBE: 36D, Right neg, left UOQ scar well healed. NICOLE. No axillary or SC lymphadenopathy.\par Reviewed CBE and recent MRI with pt. Discussed no suspicious findings. Also she has had complaints of left breast discomfort prior to surgery. Rec NSAIDs or tylenol PRN. PT on tamoxifen per Dr. Rodríguez.\par  \par Plan\par TC 32.1% per recent mmg, MRI 6/23 and then f.u.\par Tylenol, NSAIDs for occasional left breast discomfort.\par Continue on Tamoxifen\par Follow up with Dr. Rodríguez as scheduled

## 2023-10-18 ENCOUNTER — APPOINTMENT (OUTPATIENT)
Dept: ULTRASOUND IMAGING | Facility: CLINIC | Age: 73
End: 2023-10-18
Payer: MEDICARE

## 2023-10-18 PROCEDURE — 93970 EXTREMITY STUDY: CPT

## 2023-12-20 ENCOUNTER — OUTPATIENT (OUTPATIENT)
Dept: OUTPATIENT SERVICES | Facility: HOSPITAL | Age: 73
LOS: 1 days | End: 2023-12-20

## 2023-12-20 DIAGNOSIS — C50.919 MALIGNANT NEOPLASM OF UNSPECIFIED SITE OF UNSPECIFIED FEMALE BREAST: ICD-10-CM

## 2023-12-27 ENCOUNTER — APPOINTMENT (OUTPATIENT)
Dept: HEMATOLOGY ONCOLOGY | Facility: CLINIC | Age: 73
End: 2023-12-27
Payer: MEDICARE

## 2023-12-27 VITALS
HEART RATE: 72 BPM | TEMPERATURE: 98.5 F | DIASTOLIC BLOOD PRESSURE: 87 MMHG | HEIGHT: 66 IN | SYSTOLIC BLOOD PRESSURE: 147 MMHG | OXYGEN SATURATION: 96 % | BODY MASS INDEX: 33.27 KG/M2 | WEIGHT: 207 LBS

## 2023-12-27 DIAGNOSIS — D05.02 LOBULAR CARCINOMA IN SITU OF LEFT BREAST: ICD-10-CM

## 2023-12-27 PROCEDURE — 99214 OFFICE O/P EST MOD 30 MIN: CPT

## 2023-12-27 NOTE — HISTORY OF PRESENT ILLNESS
[de-identified] : Referred by: Dr. Nati Campos Diagnosis: L breast LCIS  PCP: Dr. Amy Frederick Polish Speaking  Sister translating per patient preference [place] [phone]   Ms. Strickland is a post-menopausal female who initially presented at age 72 in 2022 for evaluation of L breast LCIS.  The patient has a medical history of cholecystectomy, MI, HTN, schizophrenia (on multiple psychiatric medications).   [place] underwent screening MMG in 2021 which revealed prominence of L breast nodule, diagnostic mammo revealed stable findings. She ultimately underwent breast MRI on 22 which revealed increased enhancement of the L breast and asymmetry, recommended biopsy. L breast biopsy was performed on 22 and notable for LCIS, classic type. She is s/p L lumpectomy with Dr. Campos on 22 which was negative for residual LCIS. She denies any breast mass, nipple discharge or nipple inversion. She did have significant discomfort during the mammogram. She is healing well after surgery. Referred for discussion of risk reduction therapy. She has a normal appetite and energy level.   She currently lives with her 2 sisters who care for her. She never had children. She is post-menopausal.   Imaging reviewed:  21, NFR, Bilat sMMG: Fg, no susp findings, nodule in UO L breast appears somewhat more prominent than prev, rec add imaging, BR0 21, NFR, L dMMG: FG, bilat circumscribed breast nodularity is not sig changed including a 6x4mm circumscribed nodule in RA 3:00 mid L breast noted on recent screening; L U/S: no susp solid mass, multiple subcm sized cysts noted in UOQ up to 8mm 2:00 2N, mmg 1 year, BR2 22, NFR, L U/S: no susp solid mass, stable 8x6mm cyst 2:00 2N, 8x6mm cyst 3:00 1N, stable subcm cysts in UOQ 3mm, 4mm and 7mm, 6x5mm cyst 9:00 2N, stable 6x4mm cyst 9:00 2N, 2.5x0.7cm benign-appearing axillary lymph node, benign-appearing 10x6mm axillary lymph node, rec mmg and u/s 6 mos, BR3 22, Sharla, MRI: R negative, L overall generalized increased enhancement, somewhat asymm enhancement in central L breast approx 12:00, bx rec, BR4B  Pathology reviewed:  22, L breast bx: focal LCIS, classic type, FCC w/ stromal fibrosis, focal ductal hyperplasia, adenosis, sclerosing adenosis, apocrine metaplasia and microcysts 22: L breast lumpectomy- negative for residual LCIS, columnar cell changes, florid usual ductal hyperplasia, sclerosing adenosis, intraluminal calcifications  SH:  - Occupation: not working  - Living situation:  lives in [place] with her sister  - Smoking/etoh/illicits: remote smoker, denies etoh, denies illicits  - Exercise: has been less physically active recently   OB/GYN Hx:  - Age of menarche: 12 - Age of menopause: 43 - Pregnancy history:   FH:  - Mother with uterine cancer and brother with lung cancer. [de-identified] : [place] presents for follow up for L breast LCIS on 12/27/23.  Accompanied by her sister (whom pt prefers to translate).  Previously spoke with her psychiatrist - he was willing to hold her wellbutrin so we can try a low dose tamoxifen.  Office [phone] Dr. Gutierrez  She initiated low dose tamoxifen on 9/2022.  At today's visit she is generally feeling well.  She continues Tamoxifen 5 mg daily with good tolerance overall.  No significant hot flushes, vaginal dryness or bleeding.  She continues to experience occasional nausea after taking her medications (takes with pantoprazole).  Still  has discomfort in her L breast which is unchanged.  Had DEXA after last visit.  Breast MRI 6/2023 was reassuring; due for mammogram now.  She denies fevers, chills, CP, SOB, n/v/d.   Health Care Maintenance: Updated 12/27/23 Mammogram:  12/24/22 (Birads 2) --> due now, has script to repeat, breast MRI planned 6/24 Colonoscopy: done 2022, no longer screening  Pap smear: no longer screening, gets pelvic US  Dentist: [facility], has dentures  PCP: Dr. Riley  Dermatology screen: 1/23 -  continue annually Genetic screen: not indicated  DEXA 11/2020- normal bone density, s/p bisphosphonates x 5 years; repeated 12/2/22 - normal BMD  Vaccinations:  COVID vaccination: s/p 2 doses, 1 booster  Flu vaccine: Fall 22

## 2023-12-27 NOTE — PHYSICAL EXAM
[Fully active, able to carry on all pre-disease performance without restriction] : Status 0 - Fully active, able to carry on all pre-disease performance without restriction [Normal] : grossly intact [de-identified] : generally well appearing female, NAD, polish speaking [de-identified] : s/p L breast lumpectomy, well healed, scar tissue palpable, no other abnormalities, no LAD bilaterally  [de-identified] : prior cholecystectomy scar present [de-identified] : schizophrenia, pleasant and interactive today

## 2023-12-27 NOTE — RESULTS/DATA
[FreeTextEntry1] : Ms. Strickland is a post-menopausal female who initially presented at age 72 in August 2022 for evaluation of L breast LCIS.  The patient has a medical history of cholecystectomy, MI, HTN, schizophrenia (on multiple psychiatric medications).   Discussed with the patient the natural history and prognosis of disorders like ADH, ALH, DCIS and LCIS. We explained that the NSABP P-1 trial for breast cancer prevention, which included women at increased risks for breast cancer by age greater than 60 or a Yasmine risk model of greater than 1.66 of breast cancer over 5 years, or any woman over age 35 with LCIS), tamoxifen 20mg daily decreased the relative risk of invasive cancer by 43% and the risk of noninvasive cancer by 37%. We explained that tamoxifen has not been shown to improve survival in this setting. We discussed that the TAM01 study released results at [facility] in December 2018 showing that low dose tamoxifen (5 mg daily) for 3 years compared with placebo effectively reduced the risk of developing breast cancer in this patient population by 52%, with no increase in the incidence of VTE or uterine cancer. We discussed that the patients taking low dose tamoxifen did not have a significant increase in hot flashes when assess for severity. She was informed that if she does have hot flashes, we can treat her with low dose oxybutynin. The patient was informed that when the low dose tamoxifen was studied, there was no increase in incidence of vaginal dryness or arthralgias compared with placebo.    The patient agreed to take tamoxifen 5 mg PO daily for 3 years. She will continue to follow up with breast surgery and have regular imaging.  Wellbutrin held- she is doing well- no major changes per her sister.  Initiated low dose tamoxifen 9/20/22- tolerating well except for mild hot flushes, occasional nausea after taking

## 2024-01-12 ENCOUNTER — APPOINTMENT (OUTPATIENT)
Dept: MAMMOGRAPHY | Facility: CLINIC | Age: 74
End: 2024-01-12
Payer: MEDICARE

## 2024-01-12 ENCOUNTER — RESULT REVIEW (OUTPATIENT)
Age: 74
End: 2024-01-12

## 2024-01-12 PROCEDURE — 77067 SCR MAMMO BI INCL CAD: CPT | Mod: 59

## 2024-01-12 PROCEDURE — 77066 DX MAMMO INCL CAD BI: CPT

## 2024-01-12 PROCEDURE — 77063 BREAST TOMOSYNTHESIS BI: CPT | Mod: 59

## 2024-01-12 PROCEDURE — G0279: CPT

## 2024-01-18 ENCOUNTER — NON-APPOINTMENT (OUTPATIENT)
Age: 74
End: 2024-01-18

## 2024-02-05 RX ORDER — TAMOXIFEN CITRATE 10 MG/1
10 TABLET, FILM COATED ORAL DAILY
Qty: 45 | Refills: 3 | Status: ACTIVE | COMMUNITY
Start: 2022-09-19 | End: 1900-01-01

## 2024-06-04 ENCOUNTER — NON-APPOINTMENT (OUTPATIENT)
Age: 74
End: 2024-06-04

## 2024-06-04 ENCOUNTER — APPOINTMENT (OUTPATIENT)
Dept: OPHTHALMOLOGY | Facility: CLINIC | Age: 74
End: 2024-06-04
Payer: MEDICARE

## 2024-06-04 PROCEDURE — 92134 CPTRZ OPH DX IMG PST SGM RTA: CPT

## 2024-06-04 PROCEDURE — 92004 COMPRE OPH EXAM NEW PT 1/>: CPT

## 2024-07-01 ENCOUNTER — APPOINTMENT (OUTPATIENT)
Dept: MRI IMAGING | Facility: CLINIC | Age: 74
End: 2024-07-01

## 2024-07-01 PROCEDURE — A9585: CPT | Mod: JW

## 2024-07-01 PROCEDURE — 77049 MRI BREAST C-+ W/CAD BI: CPT

## 2024-07-08 ENCOUNTER — APPOINTMENT (OUTPATIENT)
Dept: BREAST CENTER | Facility: CLINIC | Age: 74
End: 2024-07-08
Payer: MEDICARE

## 2024-07-08 VITALS
HEART RATE: 66 BPM | WEIGHT: 193.56 LBS | OXYGEN SATURATION: 95 % | BODY MASS INDEX: 31.11 KG/M2 | TEMPERATURE: 97.7 F | DIASTOLIC BLOOD PRESSURE: 67 MMHG | SYSTOLIC BLOOD PRESSURE: 103 MMHG | HEIGHT: 66 IN

## 2024-07-08 DIAGNOSIS — D05.02 LOBULAR CARCINOMA IN SITU OF LEFT BREAST: ICD-10-CM

## 2024-07-08 DIAGNOSIS — Z78.9 OTHER SPECIFIED HEALTH STATUS: ICD-10-CM

## 2024-07-08 PROCEDURE — 99214 OFFICE O/P EST MOD 30 MIN: CPT

## 2024-07-29 ENCOUNTER — NON-APPOINTMENT (OUTPATIENT)
Age: 74
End: 2024-07-29

## 2024-07-29 ENCOUNTER — APPOINTMENT (OUTPATIENT)
Dept: OPHTHALMOLOGY | Facility: CLINIC | Age: 74
End: 2024-07-29
Payer: MEDICARE

## 2024-07-29 PROCEDURE — 92015 DETERMINE REFRACTIVE STATE: CPT | Mod: NC

## 2024-11-18 ENCOUNTER — NON-APPOINTMENT (OUTPATIENT)
Age: 74
End: 2024-11-18

## 2024-12-03 ENCOUNTER — APPOINTMENT (OUTPATIENT)
Dept: OPHTHALMOLOGY | Facility: CLINIC | Age: 74
End: 2024-12-03

## 2024-12-10 ENCOUNTER — OUTPATIENT (OUTPATIENT)
Dept: OUTPATIENT SERVICES | Facility: HOSPITAL | Age: 74
LOS: 1 days | End: 2024-12-10

## 2024-12-10 DIAGNOSIS — C50.919 MALIGNANT NEOPLASM OF UNSPECIFIED SITE OF UNSPECIFIED FEMALE BREAST: ICD-10-CM

## 2024-12-16 ENCOUNTER — APPOINTMENT (OUTPATIENT)
Dept: HEMATOLOGY ONCOLOGY | Facility: CLINIC | Age: 74
End: 2024-12-16
Payer: MEDICARE

## 2024-12-16 VITALS
DIASTOLIC BLOOD PRESSURE: 81 MMHG | SYSTOLIC BLOOD PRESSURE: 149 MMHG | WEIGHT: 191 LBS | BODY MASS INDEX: 30.83 KG/M2 | HEART RATE: 46 BPM | TEMPERATURE: 98.2 F | OXYGEN SATURATION: 95 %

## 2024-12-16 DIAGNOSIS — D05.02 LOBULAR CARCINOMA IN SITU OF LEFT BREAST: ICD-10-CM

## 2024-12-16 PROCEDURE — 99214 OFFICE O/P EST MOD 30 MIN: CPT

## 2024-12-16 PROCEDURE — G2211 COMPLEX E/M VISIT ADD ON: CPT

## 2025-03-13 ENCOUNTER — APPOINTMENT (OUTPATIENT)
Dept: MAMMOGRAPHY | Facility: CLINIC | Age: 75
End: 2025-03-13
Payer: MEDICARE

## 2025-03-13 ENCOUNTER — RESULT REVIEW (OUTPATIENT)
Age: 75
End: 2025-03-13

## 2025-03-13 PROCEDURE — 77067 SCR MAMMO BI INCL CAD: CPT

## 2025-03-13 PROCEDURE — 77063 BREAST TOMOSYNTHESIS BI: CPT

## 2025-03-18 ENCOUNTER — APPOINTMENT (OUTPATIENT)
Dept: MAMMOGRAPHY | Facility: CLINIC | Age: 75
End: 2025-03-18

## 2025-03-19 ENCOUNTER — RESULT REVIEW (OUTPATIENT)
Age: 75
End: 2025-03-19

## 2025-03-19 ENCOUNTER — APPOINTMENT (OUTPATIENT)
Dept: MAMMOGRAPHY | Facility: CLINIC | Age: 75
End: 2025-03-19

## 2025-06-25 ENCOUNTER — OUTPATIENT (OUTPATIENT)
Dept: OUTPATIENT SERVICES | Facility: HOSPITAL | Age: 75
LOS: 1 days | End: 2025-06-25

## 2025-06-25 DIAGNOSIS — C50.919 MALIGNANT NEOPLASM OF UNSPECIFIED SITE OF UNSPECIFIED FEMALE BREAST: ICD-10-CM

## 2025-06-30 ENCOUNTER — APPOINTMENT (OUTPATIENT)
Dept: HEMATOLOGY ONCOLOGY | Facility: CLINIC | Age: 75
End: 2025-06-30
Payer: MEDICARE

## 2025-06-30 ENCOUNTER — NON-APPOINTMENT (OUTPATIENT)
Age: 75
End: 2025-06-30

## 2025-06-30 VITALS
BODY MASS INDEX: 34.06 KG/M2 | TEMPERATURE: 97.8 F | SYSTOLIC BLOOD PRESSURE: 120 MMHG | DIASTOLIC BLOOD PRESSURE: 73 MMHG | WEIGHT: 211 LBS | OXYGEN SATURATION: 96 % | HEART RATE: 53 BPM

## 2025-06-30 PROCEDURE — G2211 COMPLEX E/M VISIT ADD ON: CPT

## 2025-06-30 PROCEDURE — 99213 OFFICE O/P EST LOW 20 MIN: CPT

## 2025-08-15 DIAGNOSIS — D05.02 LOBULAR CARCINOMA IN SITU OF LEFT BREAST: ICD-10-CM
